# Patient Record
Sex: FEMALE | Race: WHITE | Employment: UNEMPLOYED | URBAN - METROPOLITAN AREA
[De-identification: names, ages, dates, MRNs, and addresses within clinical notes are randomized per-mention and may not be internally consistent; named-entity substitution may affect disease eponyms.]

---

## 2021-01-22 ENCOUNTER — HOSPITAL ENCOUNTER (EMERGENCY)
Age: 81
Discharge: HOME | End: 2021-01-22
Payer: MEDICARE

## 2021-01-22 VITALS
OXYGEN SATURATION: 98 % | RESPIRATION RATE: 16 BRPM | HEIGHT: 66 IN | BODY MASS INDEX: 40.18 KG/M2 | HEART RATE: 79 BPM | TEMPERATURE: 97.5 F | WEIGHT: 250 LBS | DIASTOLIC BLOOD PRESSURE: 91 MMHG | SYSTOLIC BLOOD PRESSURE: 147 MMHG

## 2021-01-22 DIAGNOSIS — L28.2 PRURITIC RASH: Primary | ICD-10-CM

## 2021-01-22 PROCEDURE — 99283 EMERGENCY DEPT VISIT LOW MDM: CPT

## 2021-01-22 PROCEDURE — 6370000000 HC RX 637 (ALT 250 FOR IP): Performed by: PHYSICIAN ASSISTANT

## 2021-01-22 RX ORDER — CETIRIZINE HYDROCHLORIDE 10 MG/1
10 TABLET ORAL DAILY
Qty: 30 TABLET | Refills: 0 | Status: SHIPPED | OUTPATIENT
Start: 2021-01-22 | End: 2021-02-21

## 2021-01-22 RX ORDER — DIPHENHYDRAMINE HCL 25 MG
25 TABLET ORAL ONCE
Status: COMPLETED | OUTPATIENT
Start: 2021-01-22 | End: 2021-01-22

## 2021-01-22 RX ADMIN — DIPHENHYDRAMINE HCL 25 MG: 25 TABLET ORAL at 22:55

## 2021-01-22 ASSESSMENT — ENCOUNTER SYMPTOMS
SORE THROAT: 0
ABDOMINAL PAIN: 0
SHORTNESS OF BREATH: 0
NAUSEA: 0
VOMITING: 0
EYE PAIN: 0
COUGH: 0
BACK PAIN: 0

## 2021-01-23 NOTE — ED PROVIDER NOTES
[address] [facility]  ED  EMERGENCY DEPARTMENT ENCOUNTER        Pt Name: Oliver Valdivia  MRN: [id number]  [place] 1940  Date of evaluation: 1/22/2021  Provider: YOGESH Fajardo  PCP: Kristina Moore, APRN - CNP    GALLITO. I have evaluated this patient. My supervising physician was available for consultation. CHIEF COMPLAINT       Chief Complaint   Patient presents with    Rash     pt started with rash on right arm on tuesday . ... HISTORY OF PRESENT ILLNESS   (Location, Timing/Onset, Context/Setting, Quality, Duration, Modifying Factors, Severity, Associated Signs and Symptoms)  Note limiting factors. Oliver Valdivia is a 68 y.o. female presents to the emergency department for pruritic rash. Patient reports that on Tuesday she began to have itching of her right arm. Notes it is progressed to have been a rash on both her left and right arm, the only extend below the short sleeve ends. Denies tongue lip or throat swelling, fever, chest pain, shortness of breath, abdominal pain, nausea, vomiting, numbness, weakness. Has tried to topical ointments that are nonsteroidal without improvement. Has not trialed antihistamines. Nursing Notes were all reviewed and agreed with or any disagreements were addressed in the HPI. REVIEW OF SYSTEMS    (2-9 systems for level 4, 10 or more for level 5)     Review of Systems   Constitutional: Negative for fever. HENT: Negative for sore throat. Eyes: Negative for pain and visual disturbance. Respiratory: Negative for cough and shortness of breath. Cardiovascular: Negative for chest pain. Gastrointestinal: Negative for abdominal pain, nausea and vomiting. Genitourinary: Negative for dysuria and frequency. Musculoskeletal: Negative for back pain and neck pain. Skin: Positive for rash. Neurological: Negative for dizziness, weakness, numbness and headaches. Psychiatric/Behavioral: Negative for confusion.        Positives and Pertinent negatives as per HPI. Except as noted above in the ROS, all other systems were reviewed and negative. PAST MEDICAL HISTORY     Past Medical History:   Diagnosis Date    Arthritis     Cancer [facility]) 2013    left breast    Diabetes mellitus ([facility] [address]     pre diabetic    Hx of blood clots     DVT post partum and several superficial clots since    Hyperlipidemia     Hypertension     Neuropathy     Sleep apnea     mild         SURGICAL HISTORY     Past Surgical History:   Procedure Laterality Date    MASTECTOMY Bilateral     SHOULDER ARTHROSCOPY Right 07/05/2016    rotator cuff repair    TOTAL KNEE ARTHROPLASTY Bilateral     TUBAL LIGATION      VARICOSE VEIN SURGERY           CURRENTMEDICATIONS       Discharge Medication List as of 1/22/2021 11:00 PM      CONTINUE these medications which have NOT CHANGED    Details   Dulaglutide (TRULICITY SC) Inject into the skinHistorical Med      traMADol (ULTRAM) 50 MG tablet Take 1 tablet by mouth every 6 hours as needed for Pain, Disp-40 tablet, R-0      oxyCODONE-acetaminophen (PERCOCET) 7.5-325 MG per tablet Take one to 2 tablets by mouth every 4-6 hours as needed for pain, Disp-40 tablet, R-0      pregabalin (LYRICA) 150 MG capsule Take 300 mg by mouth nightly      aspirin 325 MG tablet Take 975 mg by mouth daily       ibuprofen (ADVIL;MOTRIN) 600 MG tablet Take 600 mg by mouth every 6 hours as needed for Pain      quiNINE 324 MG capsule Take 324 mg by mouth nightly       lisinopril (PRINIVIL;ZESTRIL) 20 MG tablet Take 20 mg by mouth daily. Pyridoxine HCl (VITAMIN B-6) 100 MG tablet Take 200 mg by mouth 2 times daily. Multiple Vitamins-Minerals (THERAPEUTIC MULTIVITAMIN-MINERALS) tablet Take 1 tablet by mouth daily. ALLERGIES     Food    FAMILYHISTORY     History reviewed. No pertinent family history.        SOCIAL HISTORY       Social History     Tobacco Use    Smoking status: Never Smoker    Smokeless tobacco: Never Used Substance Use Topics    Alcohol use: No    Drug use: No       SCREENINGS             PHYSICAL EXAM    (up to 7 for level 4, 8 or more for level 5)     ED Triage Vitals   BP Temp Temp Source Pulse Resp SpO2 Height Weight   01/22/21 2253 01/22/21 2235 01/22/21 2235 01/22/21 2230 01/22/21 2235 01/22/21 2230 01/22/21 2235 01/22/21 2235   (!) 147/91 97.5 °F (36.4 °C) Oral 96 16 98 % 5' 6\" (1.676 m) 250 lb (113.4 kg)       Physical Exam  Vitals signs reviewed. Constitutional:       Appearance: She is not diaphoretic. HENT:      Nose: No congestion or rhinorrhea. Mouth/Throat:      Mouth: Mucous membranes are moist.      Pharynx: Oropharynx is clear. No oropharyngeal exudate or posterior oropharyngeal erythema. Eyes:      General: No scleral icterus. Conjunctiva/sclera: Conjunctivae normal.   Neck:      Musculoskeletal: Normal range of motion and neck supple. Cardiovascular:      Rate and Rhythm: Normal rate and regular rhythm. Pulses: Normal pulses. Pulmonary:      Effort: Pulmonary effort is normal. No respiratory distress. Breath sounds: Normal breath sounds. No stridor. No wheezing, rhonchi or rales. Musculoskeletal: Normal range of motion. Skin:     General: Skin is warm and dry. Capillary Refill: Capillary refill takes less than 2 seconds. Findings: Rash present. Comments: See photos below   Neurological:      General: No focal deficit present. Mental Status: She is alert and oriented to person, place, and time. Sensory: No sensory deficit. Motor: No weakness. Gait: Gait normal.   Psychiatric:         Mood and Affect: Mood normal.         Behavior: Behavior normal.       Right Volar Forearm:      Bilateral Arms:          DIAGNOSTIC RESULTS   LABS:    Labs Reviewed - No data to display    All other labs were within normal range or not returned as of this dictation. EKG:  All EKG's are interpreted by the Emergency Department Physician in the absence of a cardiologist.  Please see their note for interpretation of EKG. RADIOLOGY:   Non-plain film images such as CT, Ultrasound and MRI are read by the radiologist. Plain radiographic images are visualized and preliminarily interpreted by the ED Provider with the below findings:        Interpretation per the Radiologist below, if available at the time of this note:    No orders to display     No results found. PROCEDURES   Unless otherwise noted below, none     Procedures    CRITICAL CARE TIME   N/A    CONSULTS:  None      EMERGENCY DEPARTMENT COURSE and DIFFERENTIAL DIAGNOSIS/MDM:   Vitals:    Vitals:    01/22/21 2230 01/22/21 2235 01/22/21 2253   BP:   (!) 147/91   Pulse: 96 79    Resp:  16    Temp:  97.5 °F (36.4 °C)    TempSrc:  Oral    SpO2: 98% 98%    Weight:  250 lb (113.4 kg)    Height:  5' 6\" (1.676 m)        Patient was given the following medications:  Medications   diphenhydrAMINE (BENADRYL) tablet 25 mg (25 mg Oral Given 1/22/21 2255)           62-year-old female presents emergency room with pruritic rash. No evidence of airway obstruction, anaphylaxis. Rash is not vesicular, low concern for shingles. Low concern for scabies based on distribution. She is diabetic, believe oral antibiotics carry more risk than benefit. Given prescription for cetirizine, triamcinolone 0.1% ointment. Instructed follow-up with her dermatologist, ideally to be seen within 1 week. Instructed to return the emergency room for new or worsening symptoms including but not limited to tongue lip or throat swelling, chest pain, shortness of breath, fevers, any other symptoms she is concerned about. Verbal and written discharge instructions and return precautions given. FINAL IMPRESSION      1.  Pruritic rash          DISPOSITION/PLAN   DISPOSITION Decision To Discharge 01/22/2021 10:44:51 PM      PATIENT REFERREDTO:  BRENNA Rendon - CNP  [address] [place]   [phone]    Call in 1 day  Call to schedule primary care follow-up ideally to be seen within 1 to 2 weeks. Dermatologist    Call in 1 day  Call to schedule follow-up with your dermatologist, ideally to be seen within 1 week.       DISCHARGE MEDICATIONS:  Discharge Medication List as of 1/22/2021 11:00 PM      START taking these medications    Details   cetirizine (ZYRTEC) 10 MG tablet Take 1 tablet by mouth daily, Disp-30 tablet, R-0Normal      triamcinolone (KENALOG) 0.1 % ointment Apply 1 g topically 2 times daily for 14 days, Topical, 2 TIMES DAILY Starting Fri 1/22/2021, Until Fri 2/5/2021, For 14 days, Disp-30 g, R-0, Normal             DISCONTINUED MEDICATIONS:  Discharge Medication List as of 1/22/2021 11:00 PM                 (Please note that portions of this note were completed with a voice recognition program.  Efforts were made to edit the dictations but occasionally words are mis-transcribed.)    YOGESH Urrutia (electronically signed)         Mahi rUrutia  01/22/21 3129

## 2021-12-25 ENCOUNTER — APPOINTMENT (OUTPATIENT)
Dept: GENERAL RADIOLOGY | Age: 81
End: 2021-12-25
Payer: MEDICARE

## 2021-12-25 ENCOUNTER — HOSPITAL ENCOUNTER (EMERGENCY)
Age: 81
Discharge: HOME | End: 2021-12-25
Attending: STUDENT IN AN ORGANIZED HEALTH CARE EDUCATION/TRAINING PROGRAM
Payer: MEDICARE

## 2021-12-25 VITALS
OXYGEN SATURATION: 96 % | DIASTOLIC BLOOD PRESSURE: 70 MMHG | RESPIRATION RATE: 16 BRPM | BODY MASS INDEX: 40.35 KG/M2 | HEIGHT: 66 IN | SYSTOLIC BLOOD PRESSURE: 147 MMHG | HEART RATE: 67 BPM | TEMPERATURE: 96.9 F
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
## 2021-12-25 PROCEDURE — 90471 IMMUNIZATION ADMIN: CPT | Performed by: STUDENT IN AN ORGANIZED HEALTH CARE EDUCATION/TRAINING PROGRAM

## 2021-12-25 PROCEDURE — 99283 EMERGENCY DEPT VISIT LOW MDM: CPT

## 2021-12-25 PROCEDURE — 6360000002 HC RX W HCPCS: Performed by: STUDENT IN AN ORGANIZED HEALTH CARE EDUCATION/TRAINING PROGRAM

## 2021-12-25 PROCEDURE — 90715 TDAP VACCINE 7 YRS/> IM: CPT | Performed by: STUDENT IN AN ORGANIZED HEALTH CARE EDUCATION/TRAINING PROGRAM

## 2021-12-25 PROCEDURE — 6370000000 HC RX 637 (ALT 250 FOR IP): Performed by: STUDENT IN AN ORGANIZED HEALTH CARE EDUCATION/TRAINING PROGRAM

## 2021-12-25 PROCEDURE — 73130 X-RAY EXAM OF HAND: CPT

## 2021-12-25 RX ORDER — AMOXICILLIN AND CLAVULANATE POTASSIUM 875; 125 MG/1; MG/1
1 TABLET, FILM COATED ORAL ONCE
Status: COMPLETED | OUTPATIENT
Start: 2021-12-25 | End: 2021-12-25

## 2021-12-25 RX ORDER — AMOXICILLIN AND CLAVULANATE POTASSIUM 875; 125 MG/1; MG/1
1 TABLET, FILM COATED ORAL 2 TIMES DAILY
Qty: 14 TABLET | Refills: 0 | Status: SHIPPED | OUTPATIENT
Start: 2021-12-25 | End: 2022-01-01

## 2021-12-25 RX ADMIN — TETANUS TOXOID, REDUCED DIPHTHERIA TOXOID AND ACELLULAR PERTUSSIS VACCINE, ADSORBED 0.5 ML: 5; 2.5; 8; 8; 2.5 SUSPENSION INTRAMUSCULAR at 14:09

## 2021-12-25 RX ADMIN — AMOXICILLIN AND CLAVULANATE POTASSIUM 1 TABLET: 875; 125 TABLET, FILM COATED ORAL at 14:10

## 2021-12-25 NOTE — ED PROVIDER NOTES
[address] ED      CHIEF COMPLAINT  Animal Bite (Patient was bitten on the left hand by her daughter's dog this AM, needs tetanus shot and bleeding started again.)       HISTORY OF PRESENT ILLNESS  Janes Aguilera is a 80 y.o. female  who presents to the ED complaining of dog bite to the left hand by her daughter Yanique Virk. Dog's vaccinations are up-to-date. Patient states the dog bit her while she was trying to shoo him away from some candy on the floor. She immediately bandaged the wound and came into the ED, the dog bite happened about 1 hour prior to arrival.  She denies any numbness or tingling. She does endorse pain at the site of a 2 cm laceration on the dorsum of her left hand between the third and fourth metacarpals. Denies any other injuries. Is uncertain when her last tetanus shot was. No other complaints, modifying factors or associated symptoms. I have reviewed the following from the nursing documentation. Past Medical History:   Diagnosis Date    Arthritis     Cancer [facility]) 2013    left breast    Diabetes mellitus ([facility] [address]     pre diabetic    Hx of blood clots     DVT post partum and several superficial clots since    Hyperlipidemia     Hypertension     Neuropathy     Sleep apnea     mild     Past Surgical History:   Procedure Laterality Date    MASTECTOMY Bilateral     SHOULDER ARTHROSCOPY Right 07/05/2016    rotator cuff repair    TOTAL KNEE ARTHROPLASTY Bilateral     TUBAL LIGATION      VARICOSE VEIN SURGERY       No family history on file.   Social History     Socioeconomic History    Marital status:      Spouse name: Not on file    Number of children: Not on file    Years of education: Not on file    Highest education level: Not on file   Occupational History    Not on file   Tobacco Use    Smoking status: Never Smoker    Smokeless tobacco: Never Used   Substance and Sexual Activity    Alcohol use: No    Drug use: No    Sexual activity: Not on file   Other Topics Concern    Not on file   Social History Narrative    Not on file     Social Determinants of Health     Financial Resource Strain:     Difficulty of Paying Living Expenses: Not on file   Food Insecurity:     Worried About [address] in the Last Year: Not on file    Chon of Food in the Last Year: Not on file   Transportation Needs:     Lack of Transportation (Medical): Not on file    Lack of Transportation (Non-Medical): Not on file   Physical Activity:     Days of Exercise per Week: Not on file    Minutes of Exercise per Session: Not on file   Stress:     Feeling of Stress : Not on file   Social Connections:     Frequency of Communication with Friends and Family: Not on file    Frequency of Social Gatherings with Friends and Family: Not on file    Attends Worship Services: Not on file    Active Member of [address] [place] [address] or Organizations: Not on file    Attends Club or Organization Meetings: Not on file    Marital Status: Not on file   Intimate Partner Violence:     Fear of Current or Ex-Partner: Not on file    Emotionally Abused: Not on file    Physically Abused: Not on file    Sexually Abused: Not on file   Housing Stability:     Unable to Pay for Housing in the Last Year: Not on file    Number of [place] in the Last Year: Not on file    Unstable Housing in the Last Year: Not on file     No current facility-administered medications for this encounter.      Current Outpatient Medications   Medication Sig Dispense Refill    amoxicillin-clavulanate (AUGMENTIN) 875-125 MG per tablet Take 1 tablet by mouth 2 times daily for 7 days 14 tablet 0    Dulaglutide (TRULICITY SC) Inject into the skin      traMADol (ULTRAM) 50 MG tablet Take 1 tablet by mouth every 6 hours as needed for Pain 40 tablet 0    oxyCODONE-acetaminophen (PERCOCET) 7.5-325 MG per tablet Take one to 2 tablets by mouth every 4-6 hours as needed for pain 40 tablet 0    pregabalin (LYRICA) 150 MG capsule Take 300 mg by mouth nightly      aspirin 325 MG tablet Take 975 mg by mouth daily       ibuprofen (ADVIL;MOTRIN) 600 MG tablet Take 600 mg by mouth every 6 hours as needed for Pain      quiNINE 324 MG capsule Take 324 mg by mouth nightly       lisinopril (PRINIVIL;ZESTRIL) 20 MG tablet Take 20 mg by mouth daily.  Pyridoxine HCl (VITAMIN B-6) 100 MG tablet Take 200 mg by mouth 2 times daily.  Multiple Vitamins-Minerals (THERAPEUTIC MULTIVITAMIN-MINERALS) tablet Take 1 tablet by mouth daily.        Facility-Administered Medications Ordered in Other Encounters   Medication Dose Route Frequency Provider Last Rate Last Admin    lactated ringers infusion   IntraVENous Continuous Jennifer Corea MD   Stopped at 07/05/16 1033    sodium chloride flush 0.9 % injection 10 mL  10 mL IntraVENous 2 times per day Jennifer Corea MD        sodium chloride flush 0.9 % injection 10 mL  10 mL IntraVENous PRN Jennifer Corea MD        HYDROmorphone (DILAUDID) injection 0.25 mg  0.25 mg IntraVENous Q5 Min PRN Ant Soriano MD        HYDROmorphone (DILAUDID) injection 0.5 mg  0.5 mg IntraVENous Q5 Min PRN Ant Soriano MD        HYDROmorphone (DILAUDID) injection 0.25 mg  0.25 mg IntraVENous Q5 Min PRN Ant Soriano MD   0.25 mg at 07/05/16 0919    HYDROmorphone (DILAUDID) injection 0.5 mg  0.5 mg IntraVENous Q5 Min PRN Ant Soriano MD        ondansetron [facility]) injection 4 mg  4 mg IntraVENous Q10 Min PRN Ant Soriano MD        labetalol (NORMODYNE;TRANDATE) injection 5 mg  5 mg IntraVENous Q10 Min PRN Ant Soriano MD        hydrALAZINE (APRESOLINE) injection 5 mg  5 mg IntraVENous Q10 Min PRN Ant Soriano MD        meperidine (DEMEROL) injection 12.5 mg  12.5 mg IntraVENous Q5 Min PRN Ant Soriano MD         Allergies   Allergen Reactions    Food      cashews       REVIEW OF SYSTEMS  10 systems reviewed, pertinent positives per HPI otherwise noted to be negative. PHYSICAL EXAM  BP (!) 147/70   Pulse 67   Temp 96.9 °F (36.1 °C)   Resp 16   Ht 5' 6\" (1.676 m)   SpO2 96%   BMI 40.35 kg/m²    General: Appears well. Alert  HEENT: Head atraumatic, Eyes normal inspection, PERRL. Normal ENT inspection, Pharynx normal. No signs of dehydration  NECK: Normal inspection  RESPIRATORY: Normal breath sounds. No chest wall tenderness. No respiratory distress  CVS: Heart rate and rhythm regular. No Murmurs  BACK: Normal inspection  EXTREMITIES: 2 cm laceration on the dorsum of the left hand between the third and fourth metacarpals. No active bleeding. Full ROM. Normal appearance. No Pedal edema  NEURO: Alert and oriented. Sensation normal. Motor normal  PSYCH: Mood normal. Affect normal.  SKIN: Color normal. No rash. Warm, Dry    RADIOLOGY  XR HAND LEFT (MIN 3 VIEWS)   Final Result   Soft tissue swelling and probable puncture wound along the dorsum of the left   hand. No radiopaque foreign body or underlying skeletal injury. ED COURSE/MDM  Patient seen and evaluated. Old records reviewed. Labs and imaging reviewed and results discussed with patient. X-rays obtained showing no foreign body or fracture. Tetanus updated first dose Augmentin given in the ED. Wound is thoroughly cleansed with chlorhexidine and irrigated with 500 cc normal saline. She is discharged with prescription for Augmentin and return precautions for severe pain, swelling, redness, purulent drainage. Otherwise she will follow up with her PCP in 2 to 3 days for wound recheck. During the patient's ED course, the patient was given:  Medications   Tetanus-Diphth-Acell Pertussis (BOOSTRIX) injection 0.5 mL (0.5 mLs IntraMUSCular Given 12/25/21 1409)   amoxicillin-clavulanate (AUGMENTIN) 875-125 MG per tablet 1 tablet (1 tablet Oral Given 12/25/21 1410)        CLINICAL IMPRESSION  1.  Dog bite, initial encounter        Blood pressure (!) 147/70, pulse 67, temperature 96.9 °F (36.1 °C), resp. rate 16, height 5' 6\" (1.676 m), SpO2 96 %. DISPOSITION  Skye Beckham was discharged to home in stable condition. Patient was given scripts for the following medications. I counseled patient how to take these medications. Discharge Medication List as of 12/25/2021  2:57 PM      START taking these medications    Details   amoxicillin-clavulanate (AUGMENTIN) 875-125 MG per tablet Take 1 tablet by mouth 2 times daily for 7 days, Disp-14 tablet, R-0Normal             Follow-up with: Kevin Boothe [id number]  [phone]    Call in 3 days  For wound re-check      DISCLAIMER: This chart was created using Dragon dictation software. Efforts were made by me to ensure accuracy, however some errors may be present due to limitations of this technology and occasionally words are not transcribed correctly.        Daniela Johns, DO  12/25/21 4144

## 2021-12-25 NOTE — ED NOTES
Discharge instructions given, pt verbalized understanding. Discussed follow-up appointments and medications. No questions or concerns at this time. Pt ambulated independently out of ER. Pt discharged with 1 prescription.       Cami Merlos RN  12/25/21 7930

## 2021-12-25 NOTE — Clinical Note
Gerald Feliz was seen and treated in our emergency department on 12/25/2021. She may return to work on 12/27/2021. If you have any questions or concerns, please don't hesitate to call.       Daniela Johns, DO

## 2022-06-27 ENCOUNTER — APPOINTMENT (OUTPATIENT)
Dept: CT IMAGING | Age: 82
End: 2022-06-27
Payer: MEDICARE

## 2022-06-27 ENCOUNTER — HOSPITAL ENCOUNTER (EMERGENCY)
Age: 82
Discharge: HOME | End: 2022-06-27
Payer: MEDICARE

## 2022-06-27 VITALS
WEIGHT: 250 LBS | TEMPERATURE: 98 F | HEIGHT: 66 IN | HEART RATE: 80 BPM | BODY MASS INDEX: 40.18 KG/M2 | RESPIRATION RATE: 16 BRPM | OXYGEN SATURATION: 98 % | SYSTOLIC BLOOD PRESSURE: 110 MMHG | DIASTOLIC BLOOD PRESSURE: 78 MMHG

## 2022-06-27 DIAGNOSIS — R09.81 NASAL CONGESTION: ICD-10-CM

## 2022-06-27 DIAGNOSIS — I10 ESSENTIAL HYPERTENSION: ICD-10-CM

## 2022-06-27 DIAGNOSIS — U07.1 COVID: Primary | ICD-10-CM

## 2022-06-27 DIAGNOSIS — H65.22 LEFT CHRONIC SEROUS OTITIS MEDIA: ICD-10-CM

## 2022-06-27 LAB
A/G RATIO: 2 (ref 1.1–2.2)
ALBUMIN SERPL-MCNC: 4.3 G/DL (ref 3.4–5)
ALP BLD-CCNC: 109 U/L (ref 40–129)
ALT SERPL-CCNC: 42 U/L (ref 10–40)
ANION GAP SERPL CALCULATED.3IONS-SCNC: 12 MMOL/L (ref 3–16)
AST SERPL-CCNC: 68 U/L (ref 15–37)
BASOPHILS ABSOLUTE: 0.1 K/UL (ref 0–0.2)
BASOPHILS RELATIVE PERCENT: 0.7 %
BILIRUB SERPL-MCNC: 0.5 MG/DL (ref 0–1)
BUN BLDV-MCNC: 10 MG/DL (ref 7–20)
CALCIUM SERPL-MCNC: 10.5 MG/DL (ref 8.3–10.6)
CHLORIDE BLD-SCNC: 105 MMOL/L (ref 99–110)
CO2: 23 MMOL/L (ref 21–32)
CREAT SERPL-MCNC: 0.6 MG/DL (ref 0.6–1.2)
EOSINOPHILS ABSOLUTE: 0.2 K/UL (ref 0–0.6)
EOSINOPHILS RELATIVE PERCENT: 2.7 %
GFR AFRICAN AMERICAN: >60
GFR NON-AFRICAN AMERICAN: >60
GLUCOSE BLD-MCNC: 127 MG/DL (ref 70–99)
HCT VFR BLD CALC: 38.9 % (ref 36–48)
HEMOGLOBIN: 12.8 G/DL (ref 12–16)
INFLUENZA A: NOT DETECTED
INFLUENZA B: NOT DETECTED
LYMPHOCYTES ABSOLUTE: 1.8 K/UL (ref 1–5.1)
LYMPHOCYTES RELATIVE PERCENT: 19.8 %
MCH RBC QN AUTO: 32.1 PG (ref 26–34)
MCHC RBC AUTO-ENTMCNC: 32.9 G/DL (ref 31–36)
MCV RBC AUTO: 97.5 FL (ref 80–100)
MONOCYTES ABSOLUTE: 0.5 K/UL (ref 0–1.3)
MONOCYTES RELATIVE PERCENT: 6 %
NEUTROPHILS ABSOLUTE: 6.3 K/UL (ref 1.7–7.7)
NEUTROPHILS RELATIVE PERCENT: 70.8 %
PDW BLD-RTO: 14.2 % (ref 12.4–15.4)
PLATELET # BLD: 279 K/UL (ref 135–450)
PMV BLD AUTO: 8.1 FL (ref 5–10.5)
POTASSIUM REFLEX MAGNESIUM: 4.3 MMOL/L (ref 3.5–5.1)
RBC # BLD: 3.99 M/UL (ref 4–5.2)
SARS-COV-2 RNA, RT PCR: DETECTED
SODIUM BLD-SCNC: 140 MMOL/L (ref 136–145)
TOTAL PROTEIN: 6.4 G/DL (ref 6.4–8.2)
WBC # BLD: 9 K/UL (ref 4–11)

## 2022-06-27 PROCEDURE — 85025 COMPLETE CBC W/AUTO DIFF WBC: CPT

## 2022-06-27 PROCEDURE — 80053 COMPREHEN METABOLIC PANEL: CPT

## 2022-06-27 PROCEDURE — 36415 COLL VENOUS BLD VENIPUNCTURE: CPT

## 2022-06-27 PROCEDURE — 70487 CT MAXILLOFACIAL W/DYE: CPT

## 2022-06-27 PROCEDURE — 87636 SARSCOV2 & INF A&B AMP PRB: CPT

## 2022-06-27 PROCEDURE — 6360000004 HC RX CONTRAST MEDICATION: Performed by: PHYSICIAN ASSISTANT

## 2022-06-27 PROCEDURE — 99285 EMERGENCY DEPT VISIT HI MDM: CPT

## 2022-06-27 RX ORDER — AMOXICILLIN AND CLAVULANATE POTASSIUM 875; 125 MG/1; MG/1
1 TABLET, FILM COATED ORAL 2 TIMES DAILY
COMMUNITY

## 2022-06-27 RX ORDER — CEFDINIR 300 MG/1
300 CAPSULE ORAL 2 TIMES DAILY
COMMUNITY

## 2022-06-27 RX ORDER — METHYLPREDNISOLONE 4 MG/1
2 TABLET ORAL DAILY
COMMUNITY
End: 2022-06-27 | Stop reason: ALTCHOICE

## 2022-06-27 RX ORDER — METHYLPREDNISOLONE 4 MG/1
TABLET ORAL
Qty: 1 KIT | Refills: 0 | Status: SHIPPED | OUTPATIENT
Start: 2022-06-27 | End: 2022-07-03

## 2022-06-27 RX ADMIN — IOPAMIDOL 75 ML: 755 INJECTION, SOLUTION INTRAVENOUS at 14:53

## 2022-06-27 ASSESSMENT — PAIN DESCRIPTION - LOCATION: LOCATION: EAR

## 2022-06-27 ASSESSMENT — PAIN SCALES - GENERAL: PAINLEVEL_OUTOF10: 2

## 2022-06-27 NOTE — ED PROVIDER NOTES
[address] ED  EMERGENCY DEPARTMENT ENCOUNTER        Pt Name: Garth Greenfield  MRN: [id number]  [place] 1940  Date of evaluation: 6/27/2022  Provider: YOGESH Maloney  PCP: Danny Rivera, BRENNA - CNP    This patient was not seen and evaluated by the attending physician No att. providers found. I have evaluated this patient. My supervising physician was available for consultation. CHIEF COMPLAINT       Chief Complaint   Patient presents with    Otalgia     infection since june 7 has been on two different meds not helping       HISTORY OF PRESENT ILLNESS   (Location/Symptom, Timing/Onset, Context/Setting, Quality, Duration, Modifying Factors, Severity)  Note limiting factors. Garth Greenfield is a 80 y.o. female with past medical history of diabetes, hypertension, hyperlipidemia, history of VTE who presents via private vehicle from her home for evaluation of otalgia. Patient notes that ever since June 7 she has been having chronic pain to her left ear. She endorses that her symptoms started out with what she thought was a sinus infection she had nasal congestion sinus pressure runny nose mild headaches and fullness and pain to the left ear. She was treated by her family doctor with Augmentin. She notes that the sinus symptoms improved however the ear pain persisted. She was started on Medrol Dosepak and cephalosporin. She notes that she has finished that antibiotic and she is still having pain and fullness to the left ear. She denies jaw pain or radiating pain. She denies headache neck pain. She denies any fevers. No chest pain or shortness of breath no cough no GI symptoms. She denies any dizziness. She denies any drainage from the ear. Nursing Notes were all reviewed and agreed with or any disagreements were addressed  in the HPI. Pt was seen during the [place] 19 pandemic. Appropriate PPE worn by ME during patient encounters.  Pt seen during a time with Take 324 mg by mouth nightly     TRAMADOL (ULTRAM) 50 MG TABLET    Take 1 tablet by mouth every 6 hours as needed for Pain         ALLERGIES     Food    FAMILYHISTORY     History reviewed. No pertinent family history. SOCIAL HISTORY       Social History     Socioeconomic History    Marital status:      Spouse name: None    Number of children: None    Years of education: None    Highest education level: None   Occupational History    None   Tobacco Use    Smoking status: Never Smoker    Smokeless tobacco: Never Used   Substance and Sexual Activity    Alcohol use: No    Drug use: No    Sexual activity: None   Other Topics Concern    None   Social History Narrative    None     Social Determinants of Health     Financial Resource Strain:     Difficulty of Paying Living Expenses: Not on file   Food Insecurity:     Worried About Running Out of Food in the Last Year: Not on file    Chon of Food in the Last Year: Not on file   Transportation Needs:     Lack of Transportation (Medical): Not on file    Lack of Transportation (Non-Medical):  Not on file   Physical Activity:     Days of Exercise per Week: Not on file    Minutes of Exercise per Session: Not on file   Stress:     Feeling of Stress : Not on file   Social Connections:     Frequency of Communication with Friends and Family: Not on file    Frequency of Social Gatherings with Friends and Family: Not on file    Attends [employer] Services: Not on file    Active Member of Clubs or Organizations: Not on file    Attends Club or Organization Meetings: Not on file    Marital Status: Not on file   Intimate Partner Violence:     Fear of Current or Ex-Partner: Not on file    Emotionally Abused: Not on file    Physically Abused: Not on file    Sexually Abused: Not on file   Housing Stability:     Unable to Pay for Housing in the Last Year: Not on file    Number of [place] in the Last Year: Not on file    Unstable Housing in the Last Year: Not on file       SCREENINGS             PHYSICAL EXAM    (up to 7 for level 4, 8 or more for level 5)     ED Triage Vitals [06/27/22 1215]   BP Temp Temp Source Heart Rate Resp SpO2 Height Weight   (!) 150/78 97.8 °F (36.6 °C) Oral 81 16 99 % 5' 6\" (1.676 m) 250 lb (113.4 kg)       Physical Exam  Vitals and nursing note reviewed. Constitutional:       General: She is not in acute distress. Appearance: She is well-developed. She is not ill-appearing, toxic-appearing or diaphoretic. HENT:      Head: Normocephalic and atraumatic. Jaw: There is normal jaw occlusion. Salivary Glands: Right salivary gland is not diffusely enlarged or tender. Left salivary gland is not diffusely enlarged or tender. Right Ear: Hearing, tympanic membrane, ear canal and external ear normal.      Left Ear: External ear normal. No mastoid tenderness. Tympanic membrane is bulging. Ears:      Comments: Inspection of the left ear reveals normal external auditory canal.  There is no pinna or EAC tenderness with manipulation. There is no canal erythema or drainage. The TM is bulging with serous effusion but not erythematous. There is no mastoid tenderness redness or warmth. Nose: Nose normal. No congestion or rhinorrhea. Mouth/Throat:      Mouth: Mucous membranes are moist.      Pharynx: Oropharynx is clear. Eyes:      General:         Right eye: No discharge. Left eye: No discharge. Extraocular Movements: Extraocular movements intact. Conjunctiva/sclera: Conjunctivae normal.      Pupils: Pupils are equal, round, and reactive to light. Cardiovascular:      Rate and Rhythm: Normal rate and regular rhythm. Pulses: Normal pulses. Heart sounds: Normal heart sounds. No murmur heard. No friction rub. No gallop. Pulmonary:      Effort: Pulmonary effort is normal. No respiratory distress. Breath sounds: Normal breath sounds. No wheezing or rales.    Abdominal: Palpations: Abdomen is soft. Tenderness: There is no abdominal tenderness. Musculoskeletal:      Cervical back: Normal range of motion and neck supple. No rigidity or tenderness. Lymphadenopathy:      Cervical: No cervical adenopathy. Skin:     General: Skin is warm and dry. Capillary Refill: Capillary refill takes less than 2 seconds. Neurological:      General: No focal deficit present. Mental Status: She is alert and oriented to person, place, and time. Cranial Nerves: No cranial nerve deficit. Motor: No weakness. Psychiatric:         Mood and Affect: Mood normal.         Behavior: Behavior normal.           DIAGNOSTIC RESULTS   LABS:    Labs Reviewed - No data to display    All other labs were within normal range or not returned as of this dictation. EKG: All EKG's are interpreted by the Emergency Department Physician who either signs orCo-signs this chart in the absence of a cardiologist.  Please see their note for interpretation of EKG. RADIOLOGY:   Non-plain film images such as CT, Ultrasound and MRI are read by the radiologist. Plain radiographic images are visualized andpreliminarily interpreted by the  ED Provider with the below findings:        Interpretation perthe Radiologist below, if available at the time of this note:    No orders to display     No results found.        PROCEDURES   Unless otherwise noted below, none     Procedures    CRITICAL CARE TIME   N/A    CONSULTS:  None      EMERGENCY DEPARTMENT COURSE and DIFFERENTIALDIAGNOSIS/MDM:   Vitals:    Vitals:    06/27/22 1215   BP: (!) 150/78   Pulse: 81   Resp: 16   Temp: 97.8 °F (36.6 °C)   TempSrc: Oral   SpO2: 99%   Weight: 250 lb (113.4 kg)   Height: 5' 6\" (1.676 m)       Patient was given thefollowing medications:  Medications - No data to display    PDMP Monitoring:    Last PDMP [facility] as Reviewed [facility]):  Review User Review Instant Review Result            Urine Drug Screenings (1 yr)    No resulted COVID I believe she is reasonable candidate for discharge home. Clinical management tool, COVID-19 risk of decompensation January 2022    Adult with COVID-19 and no other condition requiring admission    Severe respiratory distress YES/NO: No   Unstable by clinical judgment YES/NO: No   Needs O2 to keep from SPO2 greater than 90 YES/NO: No   Acute delirium YES/NO: No     Clinical risk score  CHF, COPD, age >57 Yes +1   Chest x-ray severe bilateral or 2+ lobar infiltrates No   Chest X-ray 1 lobar infiltrate No   Heart rate greater than 100 at disposition  No   RR > 22 No   Vaccination Status: Full +/- Booster Yes -1   Vaccination Status: Partial   Vaccination Status: Unvaccinated No   No     Total: 0       Score 0-3: Low Risk    Based on patient's clinical history and clinical findings I currently estimate there is low risk for: bacterial sinusitis, viral/strep pharyngitis, malignant otitis  externa, RPA, PTA,  dental infection, parotitis, mastoiditis, [place] Palsy, barosinusitis, temporal arteritis. We discussed signs and symptoms that would warrant return to the ED (hearing loss, dizziness, bleeding, worsening ear pain, fever, worsening headache, vision loss, eye pain). Pt is in agreement with the current plan and all questions were addressed. Is this patient to be included in the SEP-1 Core Measure due to severe sepsis or septic shock? No   Exclusion criteria - the patient is NOT to be included for SEP-1 Core Measure due to: Infection is not suspected    Discharge Time out:  CC Reviewed Yes   Test Results Yes     Vitals:    06/27/22 1215   BP: (!) 150/78   Pulse: 81   Resp: 16   Temp: 97.8 °F (36.6 °C)   SpO2: 99%              FINAL IMPRESSION      1. COVID    2. Left chronic serous otitis media    3. Nasal congestion    4. Essential hypertension          DISPOSITION/PLAN   DISPOSITION        PATIENT REFERREDTO:  No follow-up provider specified.     DISCHARGE MEDICATIONS:  New Prescriptions    No medications on file       DISCONTINUED MEDICATIONS:  Discontinued Medications    No medications on file              (Please note that portions ofthis note were completed with a voice recognition program.  Efforts were made to edit the dictations but occasionally words are mis-transcribed.)    Mahi Trammell (electronically signed)        Mahi Trammell  06/30/22 9401

## 2023-01-17 ENCOUNTER — APPOINTMENT (OUTPATIENT)
Dept: CT IMAGING | Age: 83
End: 2023-01-17
Payer: MEDICARE

## 2023-01-17 ENCOUNTER — HOSPITAL ENCOUNTER (EMERGENCY)
Age: 83
Discharge: HOME | End: 2023-01-17
Payer: MEDICARE

## 2023-01-17 VITALS
DIASTOLIC BLOOD PRESSURE: 77 MMHG | HEART RATE: 82 BPM | OXYGEN SATURATION: 93 % | BODY MASS INDEX: 40.35 KG/M2 | WEIGHT: 250 LBS | RESPIRATION RATE: 17 BRPM | SYSTOLIC BLOOD PRESSURE: 148 MMHG | TEMPERATURE: 97.9 F

## 2023-01-17 DIAGNOSIS — M54.50 ACUTE MIDLINE LOW BACK PAIN WITHOUT SCIATICA: Primary | ICD-10-CM

## 2023-01-17 PROCEDURE — 6370000000 HC RX 637 (ALT 250 FOR IP): Performed by: PHYSICIAN ASSISTANT

## 2023-01-17 PROCEDURE — 72131 CT LUMBAR SPINE W/O DYE: CPT

## 2023-01-17 PROCEDURE — 99284 EMERGENCY DEPT VISIT MOD MDM: CPT

## 2023-01-17 RX ORDER — METHOCARBAMOL 750 MG/1
750 TABLET, FILM COATED ORAL 4 TIMES DAILY
Qty: 40 TABLET | Refills: 0 | Status: SHIPPED | OUTPATIENT
Start: 2023-01-17 | End: 2023-01-27

## 2023-01-17 RX ORDER — METHOCARBAMOL 750 MG/1
750 TABLET, FILM COATED ORAL ONCE
Status: COMPLETED | OUTPATIENT
Start: 2023-01-17 | End: 2023-01-17

## 2023-01-17 RX ADMIN — METHOCARBAMOL TABLETS 750 MG: 750 TABLET, COATED ORAL at 16:22

## 2023-01-17 ASSESSMENT — PAIN - FUNCTIONAL ASSESSMENT
PAIN_FUNCTIONAL_ASSESSMENT: 0-10
PAIN_FUNCTIONAL_ASSESSMENT: NONE - DENIES PAIN

## 2023-01-17 ASSESSMENT — PAIN DESCRIPTION - LOCATION: LOCATION: BACK

## 2023-01-17 ASSESSMENT — PAIN SCALES - GENERAL: PAINLEVEL_OUTOF10: 8

## 2023-01-17 NOTE — ED PROVIDER NOTES
[address] [facility]  ED  Emergency Department Encounter    Patient Name: John Mello  MRN: [id number]  YOB: 1940  Date of Evaluation: 1/17/2023  Provider: BRENNA Art CNP  Note Started: 2:17 PM EST 1/17/23    CHIEF COMPLAINT  Back Pain (Patient states she woke up Monday morning with lower back pain. No known injury. )    SHARED SERVICE VISIT  Evaluated by GALLITO. My supervising physician was available for consultation. HISTORY OF PRESENT ILLNESS  John Mello is a [de-identified] y.o. female who presents to the ED several day history of low back pain. Brought in by  for evaluation. She denies trauma or injury. Low back pain sharp and stabbing in nature with movement. Throbbing and aching at rest.  States that on occasion she has had pain into the hip and down leg. She denies any numbness, tingling, weakness of extremity. No loss of bowel or bladder function. No saddle anesthesia. Ambulatory at baseline without discomfort. Attempted Voltaren gel without significant improvement of symptoms. History of neuropathy. No numbness or tingling or weakness of lower extremities outside of baseline. .    No other complaints, modifying factors or associated symptoms. Nursing notes reviewed were all reviewed and agreed with or any disagreements were addressed in the HPI. PMH:  Past Medical History:   Diagnosis Date    Arthritis     Cancer ([facility] [address] 2013    left breast    Diabetes mellitus ([facility] [address]     pre diabetic    Hx of blood clots     DVT post partum and several superficial clots since    Hyperlipidemia     Hypertension     Neuropathy     Sleep apnea     mild     Surgical History:  Past Surgical History:   Procedure Laterality Date    MASTECTOMY Bilateral     SHOULDER ARTHROSCOPY Right 07/05/2016    rotator cuff repair    TOTAL KNEE ARTHROPLASTY Bilateral     TUBAL LIGATION      VARICOSE VEIN SURGERY       Family History:  History reviewed. No pertinent family history.   Social History:  Social History     Socioeconomic History    Marital status:      Spouse name: Not on file    Number of children: Not on file    Years of education: Not on file    Highest education level: Not on file   Occupational History    Not on file   Tobacco Use    Smoking status: Never    Smokeless tobacco: Never   Vaping Use    Vaping Use: Never used   Substance and Sexual Activity    Alcohol use: No    Drug use: No    Sexual activity: Not Currently   Other Topics Concern    Not on file   Social History Narrative    Not on file     Social Determinants of Health     Financial Resource Strain: Not on file   Food Insecurity: Not on file   Transportation Needs: Not on file   Physical Activity: Not on file   Stress: Not on file   Social Connections: Not on file   Intimate Partner Violence: Not on file   Housing Stability: Not on file     Current Medications:  No current facility-administered medications for this encounter. Current Outpatient Medications   Medication Sig Dispense Refill    methocarbamol (ROBAXIN-750) 750 MG tablet Take 1 tablet by mouth 4 times daily for 10 days 40 tablet 0    amoxicillin-clavulanate (AUGMENTIN) 875-125 MG per tablet Take 1 tablet by mouth 2 times daily      cefdinir (OMNICEF) 300 MG capsule Take 300 mg by mouth 2 times daily      Dulaglutide (TRULICITY SC) Inject into the skin      traMADol (ULTRAM) 50 MG tablet Take 1 tablet by mouth every 6 hours as needed for Pain 40 tablet 0    oxyCODONE-acetaminophen (PERCOCET) 7.5-325 MG per tablet Take one to 2 tablets by mouth every 4-6 hours as needed for pain 40 tablet 0    pregabalin (LYRICA) 150 MG capsule Take 300 mg by mouth nightly      aspirin 325 MG tablet Take 975 mg by mouth daily       ibuprofen (ADVIL;MOTRIN) 600 MG tablet Take 600 mg by mouth every 6 hours as needed for Pain      quiNINE 324 MG capsule Take 324 mg by mouth nightly       lisinopril (PRINIVIL;ZESTRIL) 20 MG tablet Take 20 mg by mouth daily.       Pyridoxine HCl (VITAMIN B-6) 100 MG tablet Take 200 mg by mouth 2 times daily. Multiple Vitamins-Minerals (THERAPEUTIC MULTIVITAMIN-MINERALS) tablet Take 1 tablet by mouth daily. Facility-Administered Medications Ordered in Other Encounters   Medication Dose Route Frequency Provider Last Rate Last Admin    lactated ringers infusion   IntraVENous Continuous Hyacinth Coffey MD   Stopped at 07/05/16 1033    sodium chloride flush 0.9 % injection 10 mL  10 mL IntraVENous 2 times per day Hyacinth Coffey MD        sodium chloride flush 0.9 % injection 10 mL  10 mL IntraVENous PRN Hyacinth Coffey MD        HYDROmorphone (DILAUDID) injection 0.25 mg  0.25 mg IntraVENous Q5 Min PRN Kiko Griffiths MD        HYDROmorphone (DILAUDID) injection 0.5 mg  0.5 mg IntraVENous Q5 Min PRN Kiko Griffiths MD        HYDROmorphone (DILAUDID) injection 0.25 mg  0.25 mg IntraVENous Q5 Min PRN Kiko Griffiths MD   0.25 mg at 07/05/16 0919    HYDROmorphone (DILAUDID) injection 0.5 mg  0.5 mg IntraVENous Q5 Min PRN Kiko Griffiths MD        [facility]etron [facility]) injection 4 mg  4 mg IntraVENous Q10 Min PRN Kiko Griffiths MD        labetalol (NORMODYNE;TRANDATE) injection 5 mg  5 mg IntraVENous Q10 Min PRN Kiko Griffiths MD        hydrALAZINE (APRESOLINE) injection 5 mg  5 mg IntraVENous Q10 Min PRN Kiko Griffiths MD        meperidine (DEMEROL) injection 12.5 mg  12.5 mg IntraVENous Q5 Min PRN Kiko Griffiths MD         Allergies: Allergies   Allergen Reactions    Food      cashews     Screenings:     Vahid Coma Scale  Eye Opening: Spontaneous  Best Verbal Response: Oriented  Best Motor Response: Obeys commands  Vahid Coma Scale Score: 15           CIWA Assessment  BP: (!) 148/77  Heart Rate: 82          REVIEW OF SYSTEMS  Positives and Pertinent Negatives as per HPI.     PHYSICAL EXAM  BP (!) 148/77   Pulse 82   Temp 97.9 °F (36.6 °C) (Oral)   Resp 17   Wt 250 lb (113.4 kg)   SpO2 93%   BMI 40.35 kg/m²     GENERAL APPEARANCE: Awake and alert. Cooperative. No acute distress. HEAD: Normocephalic. Atraumatic. EYES:  EOM's grossly intact. ENT: Mucous membranes are moist.   NECK: Supple. ABDOMEN: Soft. Non-distended. Non-tender. No midline pulsatile mass. BACK: On exam of thoracic and lumbar spine, there is no swelling, bruising, or color change noted. There is mild lumbar midline bony tenderness, without crepitus, deformity, or step off. Patient exhibits tenderness of lumbar paraspinal musculature to right and left of midline. There is no point tenderness over the SI Joint. EXTREMITIES: No peripheral edema. Patellar DTRs +2 bilaterally. Moves all extremities equally. All extremities neurovascularly intact. SKIN: Warm and dry. No acute rashes. NEUROLOGICAL: Alert and oriented. No gross facial drooping. Strength 5/5, sensation intact. HSNE spine intact. EKG  When ordered, EKG's are interpreted by the ED Physician in the absence of a Cardiologist.  Please see their note for interpretation of EKG. LABS  Labs Reviewed - No data to display  When ordered, only abnormal lab results are displayed. All other labs were within normal range or not returned as of this dictation. RADIOLOGY  Non-plain film images such as CT, U/S, and MRI are read by the radiologist.  Plain radiographic images are visualized and preliminarily interpreted by the ED Provider with the below findings:     Interpretation per the Radiologist below, if available at the time of this note:  Camille   Final Result   1. No acute traumatic injury. No acute compression fracture or destructive   changes. 2. Moderate multilevel degenerative disc disease as described with multiple   levels of moderate to severe central stenosis and areas of foraminal   stenosis. These are likely chronic findings. PROCEDURES  Unless otherwise noted below, none.     ED COURSE/DDx/MDM  History obtained from:  Patient    Vitals:  Vitals:    01/17/23 1333   BP: (!) 148/77   Pulse: 82   Resp: 17   Temp: 97.9 °F (36.6 °C)   TempSrc: Oral   SpO2: 93%   Weight: 250 lb (113.4 kg)     Patient received following medications in ED:  Medications   methocarbamol (ROBAXIN) tablet 750 mg (750 mg Oral Given 1/17/23 1622)     Sepsis Consideration:  Is this patient to be included in the SEP-1 Core Measure due to severe sepsis or septic shock? No   Exclusion criteria - the patient is NOT to be included for SEP-1 Core Measure due to: Infection is not suspected    Records Reviewed:   None relevant. Chronic conditions affecting care: Arthritis, diabetes, neuropathy. has a past medical history of Arthritis, Cancer ([facility] [address] (2013), Diabetes mellitus ([facility] [address] blood clots, Hyperlipidemia, Hypertension, Neuropathy, and Sleep apnea. Social Determinants:   None    Consults:   None    Reassessment:   Patient continues to feel uncomfortable on reevaluation. Initially declined medication although now is requesting some. MDM/Disposition Considerations:   Briefly Rosi Bronson presents for low back pain. Pain is nontraumatic without any red flag symptoms. Did obtain CT lumbar spine to evaluate bony structures with degenerative changes noted. No acute fractures. Did consider labs and other imaging based on comorbidities and age although again patient at this time appears to musculoskeletal back pain and physical comfortable discharge home outpatient orthopedic follow-up. Have discussed return precautions as well as recommendations for follow-up otherwise and she is in agreement comfortable discharge. Will be discharged on anti-inflammatories and muscle relaxants. Critical Care Time:   0 Minutes of critical care time spent not including separately billable procedures.     DDx:  I estimate there is LOW risk for ABDOMINAL AORTIC ANEURYSM, CAUDA EQUINA SYNDROME, EPIDURAL MASS LESION, SPINAL STENOSIS, OR HERNIATED DISK CAUSING SEVERE STENOSIS, thus I consider the discharge disposition reasonable. Quin Bowden and I have also discussed returning to the Emergency Department immediately if new or worsening symptoms occur. We have discussed the symptoms which are most concerning (e.g., saddle anesthesia, urinary or bowel incontinence or retention, changing or worsening pain) that necessitate immediate return. FINAL IMPRESSION  1. Acute midline low back pain without sciatica      Patient referred to: Leonor Zamudio, APRN - CNP  [address] [facility] [address]  [phone]    Schedule an appointment as soon as possible for a visit       [address] [facility]  Schedule an appointment as soon as possible for a visit       [facility]  ED  [facility] [address] [phone]    If symptoms worsen    Discharge Medications:   Discharge Medication List as of 1/17/2023  4:24 PM        START taking these medications    Details   methocarbamol (ROBAXIN-750) 750 MG tablet Take 1 tablet by mouth 4 times daily for 10 days, Disp-40 tablet, R-0Normal           Discontinued Medications:  Discharge Medication List as of 1/17/2023  4:24 PM        Risk management discussed and shared decision making had with patient and/or surrogate. All questions were answered. Patient will follow up with PCP and orthopedist within 2 to 3 days for further evaluation/treatment. All questions answered. Patient will return to ED for new/worsening symptoms. DISPOSITION:  Patient was discharged home in stable condition. (Please note that portions of this note were completed with a voice recognition program.  Efforts were made to edit the dictations but occasionally words are mis-transcribed).             Mahi Marcial  01/18/23 3380

## 2023-02-01 ENCOUNTER — HOSPITAL ENCOUNTER (OUTPATIENT)
Dept: PHYSICAL THERAPY | Age: 83
Setting detail: THERAPIES SERIES
Discharge: HOME | End: 2023-02-01
Payer: MEDICARE

## 2023-02-01 PROCEDURE — 97161 PT EVAL LOW COMPLEX 20 MIN: CPT | Performed by: PHYSICAL THERAPIST

## 2023-02-01 PROCEDURE — 97110 THERAPEUTIC EXERCISES: CPT | Performed by: PHYSICAL THERAPIST

## 2023-02-01 NOTE — FLOWSHEET NOTE
[facility],  [address] [place]  Phone: [phone]  Fax [phone]    Physical Therapy Treatment Note/ Progress Report:           Date:  2023    Patient Name:  Dexter Tena    :    MRN: [id number]  Restrictions/Precautions:    Medical/Treatment Diagnosis Information:  Back pain [M54.9]         Treatment DX:  Lumbar spine pain M54.5;  Difficulty walking R26.2                                      Insurance information:  MEDICARE  Physician Information:  BRENNA Michele *  Has the plan of care been signed (Y/N):        []  Yes  [x]  No     Date of Patient follow up with Physician:       Is this a Progress Report:     []  Yes  [x]  No        If Yes:  Date Range for reporting period:  Beginning 23  Ending    Progress report will be due (10 Rx or 30 days whichever is less):        Recertification will be due (POC Duration  / 90 days whichever is less): 10 weeks POC        Visit # Insurance Allowable Auth Required   In-person [address] []  Yes [x]  No    Telehealth   []  Yes []  No    Total            Functional Scale: OSW 50%    Date assessed:  23      Therapy Diagnosis/Practice Pattern:F      Number of Comorbidities:  []0     []1-2    [x]3+    Latex Allergy:  [x]NO      []YES  Preferred Language for Healthcare:   [x]English       []other:      Pain level:  5-8/10    SUBJECTIVE:  See eval    OBJECTIVE: See eval  Observation:   Test measurements:      RESTRICTIONS/PRECAUTIONS: DM 2, OA multiple joints, Hx of cancer - breast, HTN  (medication), Anxiety/ depression, B TKR, Right RTC repair, Hysterectomy     Exercises/Interventions:     Therapeutic Ex (33885) Sets/sec Reps Notes/CUES         Supine TA 5\" 10 x  HEP    Hip add / abd isos with TA Ball/ GTB 10 x  HEP   Bridge with TA 5\"  10 x  HEP   Piriformis stretch 20\"  3 x  HEP   Seated postural correction  5\" 10 x  HEP Manual Intervention ([id number])                                          NMR re-education (29304)   CUES NEEDED                                                         Therapeutic Activity (84941)                                          Access Code: [email or web address]  URL: [email or web address]  Date: 02/01/2023  Prepared by: Vicky Chicas     Exercises  Supine Piriformis Stretch with Foot on Ground - 1-2 x daily - 7 x weekly - 3 reps - 20 hold  Supine Transversus Abdominis Bracing - Hands on Ground - 1-2 x daily - 7 x weekly - 10 reps - 5 hold  Supine Hip Adduction Isometric with Ball - 1-2 x daily - 7 x weekly - 15 reps - 5 hold  Hooklying Abduction with Resistance - 1-2 x daily - 7 x weekly - 15 reps - 5 hold  Bridge - 1-2 x daily - 7 x weekly - 5-10 reps - 5 hold  Seated Upright Posture Correction - 1-2 x daily - 7 x weekly - 15 reps - 5 hold       Therapeutic Exercise and NMR EXR  [x] (23170) Provided verbal/tactile cueing for activities related to strengthening, flexibility, endurance, ROM  for improvements in proximal hip and core control with self care, mobility, lifting and ambulation.  [] (69921) Provided verbal/tactile cueing for activities related to improving balance, coordination, kinesthetic sense, posture, motor skill, proprioception  to assist with core control in self care, mobility, lifting, and ambulation.      Therapeutic Activities:    [] (57259 or 20183) Provided verbal/tactile cueing for activities related to improving balance, coordination, kinesthetic sense, posture, motor skill, proprioception and motor activation to allow for proper function  with self care and ADLs  [] (88751) Provided training and instruction to the patient for proper core and proximal hip recruitment and positioning with ambulation re-education     Home Exercise Program:    [x] (36164) Reviewed/Progressed HEP activities related to strengthening, flexibility, endurance, ROM of core, proximal hip and LE for functional self-care, mobility, lifting and ambulation   [] (43477) Reviewed/Progressed HEP activities related to improving balance, coordination, kinesthetic sense, posture, motor skill, proprioception of core, proximal hip and LE for self care, mobility, lifting, and ambulation      Manual Treatments:  PROM / STM / Oscillations-Mobs:  G-I, II, III, IV (PA's, Inf., Post.)  [] (07906) Provided manual therapy to mobilize proximal hip and LS spine soft tissue/joints for the purpose of modulating pain, promoting relaxation,  increasing ROM, reducing/eliminating soft tissue swelling/inflammation/restriction, improving soft tissue extensibility and allowing for proper ROM for normal function with self care, mobility, lifting and ambulation. Modalities:   Declined    Charges  Timed Code Treatment Minutes: 20   Total Treatment Minutes: 45     Medicare total (add KX at $2000)  125         [x] EVAL (LOW) 65799   [] EVAL (MOD) 40632   [] EVAL (HIGH) 21110   [] RE-EVAL     [x] HH(33673) x   1  [] IONTO  [] NMR (76548) x     [] VASO  [] Manual (92474) x      [] Other:  [] TA x      [] Mech Traction (85501)  [] ES(attended) (97344)      [] ES (un) (85432):     Goals:   Patient stated goal: Decrease pain. Able to tolerate sitting. Return to [employer]. [] Progressing: [] Met: [] Not Met: [] Adjusted     Therapist goals for Patient:   Short Term Goals: To be achieved in: 2 weeks  1. Independent in HEP and progression per patient tolerance, in order to prevent re-injury. [] Progressing: [] Met: [] Not Met: [] Adjusted  2. Patient will have a decrease in pain to facilitate improvement in movement, function, and ADLs as indicated by Functional Deficits. [] Progressing: [] Met: [] Not Met: [] Adjusted        Long Term Goals: To be achieved in: 10 weeks  1. Disability index score of 30% or less per Modified Oswestry to assist with reaching prior level of function. [] Progressing: [] Met: [] Not Met: [] Adjusted  2.  Patient will demonstrate increased AROM to WNL, good LS mobility, good hip ROM to allow for proper joint functioning as indicated by patients Functional Deficits. [] Progressing: [] Met: [] Not Met: [] Adjusted  3. Patient will demonstrate an increase in Strength to good proximal hip and core activation to allow for proper functional mobility as indicated by patients Functional Deficits. [] Progressing: [] Met: [] Not Met: [] Adjusted  4. Patient will return to tolerating standing and sitting positions for 30 minutes without increased symptoms or restriction no greater than 2/10. [] Progressing: [] Met: [] Not Met: [] Adjusted  5. Patient will return to water aerobics for general health and wellness with minimal to no symptoms or restriction. [] Progressing: [] Met: [] Not Met: [] Adjusted            Progression Towards Functional goals:  [] Patient is progressing as expected towards functional goals listed. [] Progression is slowed due to complexities listed. [] Progression has been slowed due to co-morbidities. [x] Plan just implemented, too soon to assess goals progression  [] Other:     Overall Progression Towards Functional goals/ Treatment Progress Update:  [] Patient is progressing as expected towards functional goals listed. [] Progression is slowed due to complexities/Impairments listed. [] Progression has been slowed due to co-morbidities.   [x] Plan just implemented, too soon to assess goals progression <30days   [] Goals require adjustment due to lack of progress  [] Patient is not progressing as expected and requires additional follow up with physician  [] Other    Prognosis for POC: [x] Good [] Fair  [] Poor      Patient requires continued skilled intervention: [x] Yes  [] No    Treatment/Activity Tolerance:  [x] Patient able to complete treatment  [] Patient limited by fatigue  [] Patient limited by pain    [] Patient limited by other medical complications  [] Other:     ASSESSMENT: See Eval        PLAN: See eval  [] Continue per plan of care [] Alter current plan (see comments above)  [x] Plan of care initiated [] Hold pending MD visit [] Discharge      Electronically signed by:  Brandie Jasso PT    Note: If patient does not return for scheduled/ recommended follow up visits, this note will serve as a discharge from care along with most recent update on progress.

## 2023-02-01 NOTE — PLAN OF CARE
[facility], [address], [address] [place] [address]  Phone: [phone]  Fax [phone]    Physical Therapy Certification    Dear  Juice Fontanez,     We had the pleasure of evaluating the following patient for physical therapy services at [address] [place] [address]. A summary of our findings can be found in the initial assessment below. This includes our plan of care. If you have any questions or concerns regarding these findings, please do not hesitate to contact me at the office phone number checked above. Thank you for the referral.       Physician Signature:_______________________________Date:__________________  By signing above (or electronic signature), therapists plan is approved by physician    Patient: Eduardo Dominguez   : 1940   MRN: [id number]  Referring Physician: BRENNA Honeycutt *      Evaluation Date: 2023      Medical Diagnosis Information:  Back pain [M54.9]   Treatment DX:  Lumbar spine pain M54.5; Difficulty walking R26.2                                      Insurance information:  MEDICARE       Precautions/ Contra-indications: DM 2, OA multiple joints, Hx of cancer - breast, HTN  (medication), Anxiety/ depression, B TKR, Right RTC repair, Hysterectomy     C-SSRS Triggered by Intake questionnaire (Past 2 wk assessment):   [x] No, Questionnaire did not trigger screening.   [] Yes, Patient intake triggered further evaluation      [] C-SSRS Screening completed  [] PCP notified via Plan of Care  [] Emergency services notified     Latex Allergy:  [x]NO      []YES  Preferred Language for Healthcare:   [x]English       []other:    SUBJECTIVE: Patient stated complaint:   Patient reports  woke up in the morning with increase lumbar pain. History of back injury in the [de-identified] with lifting her father. Patient reports intermittent episodes in past but last 15 years been doing well. Since injury having trouble sitting in softer surface. Most comfortable on kitchen chairs. Difficulty sleeping especially with sleeping. Feels best with knees bent up to chest.  Able to sleep once comfortable. Located centrally across belt line more on left. No radicular pain or buttock pain. Patient reports history of neuropathy and poor balance. Patient utilizes cane to walk. Patient denies any recent falls. Difficulty standing to check out of line. Able to do grocery shopping using cart. Tylenol or ibuprofen for pain control. Relevant Medical History:DM 2, OA multiple joints, Hx of cancer - breast, HTN  (medication), Anxiety/ depression, B TKR, Right RTC repair, Hysterectomy   Functional Disability Index/G-Codes:   OSW 25/50= 50%     Pain Scale: 5-8/10  Easing factors: activity modification, change of positions, heating pad, ice  Provocative factors: sit to stand transfers, sleeping, sitting soft surfaces, walking, standing, lifting, reaching, ovrhead movements. Bent over position.       Type: [x]Constant   []Intermittent  []Radiating []Localized []other:     Numbness/Tingling: bilateral neuropathy     Occupation/School: Retired    Living Status/Prior Level of Function: Independent with ADLs and IADLs, water aerobics [employer]     OBJECTIVE:     ROM     LUMBAR FLEX 100%    LUMBAR EXT 50%    Sidebend 50% challenging poor balance 50% challenging poor balance   Rotation      LEFT RIGHT   HIP Flex WNL WNL   HIP Abd WNL WNL   HIP ER WNL WNL   HIP IR WNL WNL   Knee Flex [facility]   Knee ext WNL WNL   Hamstring FLEX WNL WNL   Piriformis                Strength  LEFT RIGHT   MfA     TrA     HIP Flexors 4+ pn  4   HIP Abductors  4 4   HIP ER 4 4   Hip IR     Knee EXT (quad) 4+ 4+   Knee Flex (HS) 5 4+   Ankle DF 5 5   Ankle PF 5 5          Reflexes/Sensation:    [x]Dermatomes/Myotomes intact    []UE Reflexes     []Normal []Hypo      []Hyper   []LE Reflexes     []Normal []Hypo []Hyper   []Babinski/Clonus/Hoffmans:    []Other:    Joint mobility: hyper to normal mobility. Lumbar spine mobility not assessed. Held from getting patient into prone position   [x]Normal    []Hypo   []Hyper    Palpation: ttp lumbar spinous process L1-sacrum. PSIS bilaterally. Functional Mobility/Transfers: Independent. Difficulty with bed mobility secondary to pain    Posture: Forward center of mass. Bandages/Dressings/Incisions: NA    Gait: (include devices/WB status) Ambulates with posterior pelvic tilt, forward flexion. ER. Orthopedic Special Tests: Negative SLR                       [x] Patient history, allergies, meds reviewed. Medical chart reviewed. See intake form. Review Of Systems (ROS):  [x]Performed Review of systems (Integumentary, CardioPulmonary, Neurological) by intake and observation. Intake form has been scanned into medical record. Patient has been instructed to contact their primary care physician regarding ROS issues if not already being addressed at this time.       Co-morbidities/Complexities (which will affect course of rehabilitation):   []None           Arthritic conditions   []Rheumatoid arthritis (M05.9)  [x]Osteoarthritis (M19.91)   Cardiovascular conditions   [x]Hypertension (I10)  []Hyperlipidemia (E78.5)  []Angina pectoris (I20)  []Atherosclerosis (I70)   Musculoskeletal conditions   []Disc pathology   []Congenital spine pathologies   [x]Prior surgical intervention  []Osteoporosis (M81.8)  []Osteopenia (M85.8)   Endocrine conditions   []Hypothyroid (E03.9)  []Hyperthyroid Gastrointestinal conditions   []Constipation (E09.97)   Metabolic conditions   [x]Morbid obesity (E66.01)  [x]Diabetes type 1(E10.65) or 2 (E11.65)   [x]Neuropathy (G60.9)     Pulmonary conditions   []Asthma (J45)  []Coughing   []COPD (J44.9)   Psychological Disorders  [x]Anxiety (F41.9)  [x]Depression (F32.9)   []Other:   [x]Other:     B TKR, Right RTC repair, Hysterectomy      Barriers to/and or personal factors that will affect rehab potential:              []Age  []Sex              []Motivation/Lack of Motivation                        [x]Co-Morbidities              []Cognitive Function, education/learning barriers              []Environmental, home barriers              []profession/work barriers  []past PT/medical experience  []other:  Justification: Patient highly motivated and eager to return to PLOF. Patient with several co-morbidities but should benefit well from PT with minimal barriers to rehab. Falls Risk Assessment (30 days): POC to include LE strengthening, balance, gait, and functional task to improve balance and decrease risks of falls  [] Falls Risk assessed and no intervention required.   [x] Falls Risk assessed and Patient requires intervention due to being higher risk   TUG score (>12s at risk):     [] Falls education provided, including         ASSESSMENT:   Functional Impairments:     []Noted lumbar/proximal hip hypomobility   [x]Noted lumbosacral and/or generalized hypermobility   [x]Decreased Lumbosacral/hip/LE functional ROM   [x]Decreased core/proximal hip strength and neuromuscular control    [x]Decreased LE functional strength    []Abnormal reflexes/sensation/myotomal/dermatomal deficits  [x]Reduced balance/proprioceptive control    []other:      Functional Activity Limitations (from functional questionnaire and intake)   [x]Reduced ability to tolerate prolonged functional positions   [x]Reduced ability or difficulty with changes of positions or transfers between positions   [x]Reduced ability to maintain good posture and demonstrate good body mechanics with sitting, bending, and lifting   [x]Reduced ability to sleep   [x] Reduced ability or tolerance with driving and/or computer work   [x]Reduced ability to perform lifting, reaching, carrying tasks   [x]Reduced ability to squat   [x]Reduced ability to forward bend   [x]Reduced ability to ambulate prolonged functional periods/distances/surfaces   [x]Reduced ability to ascend/descend stairs   []other:       Participation Restrictions   [x]Reduced participation in self care activities   [x]Reduced participation in home management activities   []Reduced participation in work activities   [x]Reduced participation in social activities. []Reduced participation in sport/recreation activities. Classification:   [x]Signs/symptoms consistent with Lumbar instability/stabilization subgroup. []Signs/symptoms consistent with Lumbar mobilization/manipulation subgroup, myotomes and dermatomes intact. Meets manipulation criteria. []Signs/symptoms consistent with Lumbar direction specific/centralization subgroup   []Signs/symptoms consistent with Lumbar traction subgroup     []Signs/symptoms consistent with lumbar facet dysfunction   [x]Signs/symptoms consistent with lumbar stenosis type dysfunction   []Signs/symptoms consistent with nerve root involvement including myotome & dermatome dysfunction   []Signs/symptoms consistent with post-surgical status including: decreased ROM, strength and function.    []signs/symptoms consistent with pathology which may benefit from Dry needling     []other:      Prognosis/Rehab Potential:      []Excellent   [x]Good    []Fair   []Poor    Tolerance of evaluation/treatment:    []Excellent   [x]Good    []Fair   []Poor  Physical Therapy Evaluation Complexity Justification  [x] A history of present problem with:  [] no personal factors and/or comorbidities that impact the plan of care;  []1-2 personal factors and/or comorbidities that impact the plan of care  [x]3 personal factors and/or comorbidities that impact the plan of care  [x] An examination of body systems using standardized tests and measures addressing any of the following: body structures and functions (impairments), activity limitations, and/or participation restrictions;:  [] a total of 1-2 or more elements   [x] a total of 3 or more elements   [] a total of 4 or more elements   [x] A clinical presentation with:  [x] stable and/or uncomplicated characteristics   [] evolving clinical presentation with changing characteristics  [] unstable and unpredictable characteristics;   [x] Clinical decision making of [x] low, [] moderate, [] high complexity using standardized patient assessment instrument and/or measurable assessment of functional outcome. [x] EVAL (LOW) 91403 (typically 20 minutes face-to-face)  [] EVAL (MOD) 13548 (typically 30 minutes face-to-face)  [] EVAL (HIGH) 65752 (typically 45 minutes face-to-face)  [] RE-EVAL         PLAN: Begin PT focusing on: proximal hip mobilizations, LB mobs, LB core activation, proximal hip activation, and HEP    Frequency/Duration:  2 days per week for 10 Weeks:  Interventions:  [x]  Therapeutic exercise including: strength training, ROM, for LE, Glutes and core   [x]  NMR activation and proprioception for glutes , LE and Core   [x]  Manual therapy as indicated for Hip complex, LE and spine to include: Dry Needling/IASTM, STM, PROM, Gr I-IV mobilizations, manipulation. [x]  Modalities as needed that may include: thermal agents, E-stim, Biofeedback, US, iontophoresis as indicated  [x]  Patient education on joint protection, postural re-education, activity modification, progression of HEP. HEP instruction:   Access Code: [email or web address]: [email or web address]  Date: 02/01/2023  Prepared by: Carmen Rodriguez    Exercises  Supine Piriformis Stretch with Foot on Ground - 1-2 x daily - 7 x weekly - 3 reps - 20 hold  Supine Transversus Abdominis Bracing - Hands on Ground - 1-2 x daily - 7 x weekly - 10 reps - 5 hold  Supine Hip Adduction Isometric with Ball - 1-2 x daily - 7 x weekly - 15 reps - 5 hold  Hooklying Abduction with Resistance - 1-2 x daily - 7 x weekly - 15 reps - 5 hold  Bridge - 1-2 x daily - 7 x weekly - 5-10 reps - 5 hold  Seated Upright Posture Correction - 1-2 x daily - 7 x weekly - 15 reps - 5 hold    GOALS:  Patient stated goal: Decrease pain. Able to tolerate sitting. Return to [employer]. [] Progressing: [] Met: [] Not Met: [] Adjusted    Therapist goals for Patient:   Short Term Goals: To be achieved in: 2 weeks  1. Independent in HEP and progression per patient tolerance, in order to prevent re-injury. [] Progressing: [] Met: [] Not Met: [] Adjusted  2. Patient will have a decrease in pain to facilitate improvement in movement, function, and ADLs as indicated by Functional Deficits. [] Progressing: [] Met: [] Not Met: [] Adjusted      Long Term Goals: To be achieved in: 10 weeks  1. Disability index score of 30% or less per Modified Oswestry to assist with reaching prior level of function. [] Progressing: [] Met: [] Not Met: [] Adjusted  2. Patient will demonstrate increased AROM to WNL, good LS mobility, good hip ROM to allow for proper joint functioning as indicated by patients Functional Deficits. [] Progressing: [] Met: [] Not Met: [] Adjusted  3. Patient will demonstrate an increase in Strength to good proximal hip and core activation to allow for proper functional mobility as indicated by patients Functional Deficits. [] Progressing: [] Met: [] Not Met: [] Adjusted  4. Patient will return to tolerating standing and sitting positions for 30 minutes without increased symptoms or restriction no greater than 2/10. [] Progressing: [] Met: [] Not Met: [] Adjusted  5. Patient will return to water aerobics for general health and wellness with minimal to no symptoms or restriction.    [] Progressing: [] Met: [] Not Met: [] Adjusted       Electronically signed by:  Katelyn Almazane 429

## 2023-02-03 ENCOUNTER — HOSPITAL ENCOUNTER (OUTPATIENT)
Dept: PHYSICAL THERAPY | Age: 83
Setting detail: THERAPIES SERIES
Discharge: HOME | End: 2023-02-03
Payer: MEDICARE

## 2023-02-03 PROCEDURE — 97110 THERAPEUTIC EXERCISES: CPT | Performed by: PHYSICAL THERAPIST

## 2023-02-03 PROCEDURE — 97140 MANUAL THERAPY 1/> REGIONS: CPT | Performed by: PHYSICAL THERAPIST

## 2023-02-03 NOTE — FLOWSHEET NOTE
[facility], [address] [place]  Phone: [phone]  Fax [phone]    Physical Therapy Treatment Note/ Progress Report:           Date:  2/3/2023    Patient Name:  Ross Landaverde    :    MRN: [id number]  Restrictions/Precautions:    Medical/Treatment Diagnosis Information:  Back pain [M54.9]         Treatment DX:  Lumbar spine pain M54.5; Difficulty walking R26.2                                      Insurance information:  MEDICARE  Physician Information:  BRENNA Johnson *  Has the plan of care been signed (Y/N):        []  Yes  [x]  No     Date of Patient follow up with Physician:       Is this a Progress Report:     []  Yes  [x]  No        If Yes:  Date Range for reporting period:  Beginning 23  Ending    Progress report will be due (10 Rx or 30 days whichever is less): 24       Recertification will be due (POC Duration  / 90 days whichever is less): 10 weeks POC        Visit # Insurance Allowable Auth Required   In-person 2 [facility] []  Yes [x]  No    Telehealth   []  Yes []  No    Total            Functional Scale: OSW 50%    Date assessed:  23      Therapy Diagnosis/Practice Pattern:F      Number of Comorbidities:  []0     []1-2    [x]3+    Latex Allergy:  [x]NO      []YES  Preferred Language for Healthcare:   [x]English       []other:      Pain level:  5-8/10    SUBJECTIVE: Attempted to use pillows which allowed her to sit on soft couch for short period. Patient reports slept good last night. Sore after initial session. Hardest in bridge exercise. No real change in symptoms at this time.      OBJECTIVE: See eval  Observation:   Test measurements:      RESTRICTIONS/PRECAUTIONS: DM 2, OA multiple joints, Hx of cancer - breast, HTN  (medication), Anxiety/ depression, B TKR, Right RTC repair, Hysterectomy     Exercises/Interventions:     Therapeutic Ex (12976) Sets/sec Reps Notes/CUES   Bike X 5 min     Supine TA 5\" 10 x  HEP    HEP   Bridge with TA 5\"  10 x  HEP   HEP   Seated postural correction  5\" 10 x  HEP   TA with march   10 x     SB LTR/ DKTC 5\"  10 x ea    BKFO with TA GVL 5\"  10 x     Clamshells 5\"  15 x     Prone glute squeeze 5\"  10 x     TB row/ TB extension with TA GTB  15 x ea SBA - poor balance- seated NV         Manual Intervention (78485)      SKTC/ Manual HS stretch   X 4 min     Prone segmental distraction/ STM to paraspinals  X 4 min                             NMR re-education (65609)   CUES NEEDED                                                         Therapeutic Activity (16366)                                          Access Code: [email or web address]  URL: [email or web address]  Date: 02/01/2023  Prepared by: Sulaiman Lewis     Exercises  Supine Piriformis Stretch with Foot on Ground - 1-2 x daily - 7 x weekly - 3 reps - 20 hold  Supine Transversus Abdominis Bracing - Hands on Ground - 1-2 x daily - 7 x weekly - 10 reps - 5 hold  Supine Hip Adduction Isometric with Ball - 1-2 x daily - 7 x weekly - 15 reps - 5 hold  Hooklying Abduction with Resistance - 1-2 x daily - 7 x weekly - 15 reps - 5 hold  Bridge - 1-2 x daily - 7 x weekly - 5-10 reps - 5 hold  Seated Upright Posture Correction - 1-2 x daily - 7 x weekly - 15 reps - 5 hold       Therapeutic Exercise and NMR EXR  [x] (31127) Provided verbal/tactile cueing for activities related to strengthening, flexibility, endurance, ROM  for improvements in proximal hip and core control with self care, mobility, lifting and ambulation.  [] (41191) Provided verbal/tactile cueing for activities related to improving balance, coordination, kinesthetic sense, posture, motor skill, proprioception  to assist with core control in self care, mobility, lifting, and ambulation.      Therapeutic Activities:    [] (45056 or 02803) Provided verbal/tactile cueing for activities related to improving balance, coordination, kinesthetic sense, posture, motor skill, proprioception and motor activation to allow for proper function  with self care and ADLs  [] (32245) Provided training and instruction to the patient for proper core and proximal hip recruitment and positioning with ambulation re-education     Home Exercise Program:    [x] (88604) Reviewed/Progressed HEP activities related to strengthening, flexibility, endurance, ROM of core, proximal hip and LE for functional self-care, mobility, lifting and ambulation   [] (79515) Reviewed/Progressed HEP activities related to improving balance, coordination, kinesthetic sense, posture, motor skill, proprioception of core, proximal hip and LE for self care, mobility, lifting, and ambulation      Manual Treatments:  PROM / STM / Oscillations-Mobs:  G-I, II, III, IV (PA's, Inf., Post.)  [x] (03942) Provided manual therapy to mobilize proximal hip and LS spine soft tissue/joints for the purpose of modulating pain, promoting relaxation,  increasing ROM, reducing/eliminating soft tissue swelling/inflammation/restriction, improving soft tissue extensibility and allowing for proper ROM for normal function with self care, mobility, lifting and ambulation. Modalities:   Hook lying MHP x 10 minutes    Charges  Timed Code Treatment Minutes: 35   Total Treatment Minutes: 35     Medicare total (add KX at $2000)  181         [] EVAL (LOW) 44456   [] EVAL (MOD) 53421   [] EVAL (HIGH) 21744   [] RE-EVAL     [x] HA(54617) x   1  [] IONTO  [] NMR (28754) x     [] VASO  [x] Manual (26039) x   1   [] Other:  [] TA x      [] Mech Traction (51053)  [] ES(attended) (23961)      [] ES (un) (46888):     Goals:   Patient stated goal: Decrease pain. Able to tolerate sitting. Return to [employer]. [] Progressing: [] Met: [] Not Met: [] Adjusted     Therapist goals for Patient:   Short Term Goals: To be achieved in: 2 weeks  1. Independent in HEP and progression per patient tolerance, in order to prevent re-injury.    [] Progressing: [] Met: [] Not Met: [] Adjusted  2. Patient will have a decrease in pain to facilitate improvement in movement, function, and ADLs as indicated by Functional Deficits.  [] Progressing: [] Met: [] Not Met: [] Adjusted        Long Term Goals: To be achieved in: 10 weeks  1. Disability index score of 30% or less per Modified Oswestry to assist with reaching prior level of function.   [] Progressing: [] Met: [] Not Met: [] Adjusted  2. Patient will demonstrate increased AROM to WNL, good LS mobility, good hip ROM to allow for proper joint functioning as indicated by patients Functional Deficits.   [] Progressing: [] Met: [] Not Met: [] Adjusted  3. Patient will demonstrate an increase in Strength to good proximal hip and core activation to allow for proper functional mobility as indicated by patients Functional Deficits.   [] Progressing: [] Met: [] Not Met: [] Adjusted  4. Patient will return to tolerating standing and sitting positions for 30 minutes without increased symptoms or restriction no greater than 2/10.    [] Progressing: [] Met: [] Not Met: [] Adjusted  5. Patient will return to water aerobics for general health and wellness with minimal to no symptoms or restriction.   [] Progressing: [] Met: [] Not Met: [] Adjusted            Progression Towards Functional goals:  [] Patient is progressing as expected towards functional goals listed.    [] Progression is slowed due to complexities listed.  [] Progression has been slowed due to co-morbidities.  [x] Plan just implemented, too soon to assess goals progression  [] Other:     Overall Progression Towards Functional goals/ Treatment Progress Update:  [] Patient is progressing as expected towards functional goals listed.    [] Progression is slowed due to complexities/Impairments listed.  [] Progression has been slowed due to co-morbidities.  [x] Plan just implemented, too soon to assess goals progression <30days   [] Goals require adjustment due to lack of progress  []  Patient is not progressing as expected and requires additional follow up with physician  [] Other    Prognosis for POC: [x] Good [] Fair  [] Poor      Patient requires continued skilled intervention: [x] Yes  [] No    Treatment/Activity Tolerance:  [x] Patient able to complete treatment  [] Patient limited by fatigue  [] Patient limited by pain    [] Patient limited by other medical complications  [] Other:     ASSESSMENT: *Difficulty getting into prone position. Poor standing balance. Continue to work on core stabilization as patient tolerates. PLAN: See eval  [x] Continue per plan of care [] Alter current plan (see comments above)  [] Plan of care initiated [] Hold pending MD visit [] Discharge      Electronically signed by:  Lola Lozada PT    Note: If patient does not return for scheduled/ recommended follow up visits, this note will serve as a discharge from care along with most recent update on progress.

## 2023-02-06 ENCOUNTER — HOSPITAL ENCOUNTER (OUTPATIENT)
Dept: PHYSICAL THERAPY | Age: 83
Setting detail: THERAPIES SERIES
Discharge: HOME | End: 2023-02-06
Payer: MEDICARE

## 2023-02-06 PROCEDURE — 97110 THERAPEUTIC EXERCISES: CPT | Performed by: PHYSICAL THERAPIST

## 2023-02-06 PROCEDURE — 97140 MANUAL THERAPY 1/> REGIONS: CPT | Performed by: PHYSICAL THERAPIST

## 2023-02-06 NOTE — FLOWSHEET NOTE
[facility], [address] [place]  Phone: [phone]  Fax [phone]    Physical Therapy Treatment Note/ Progress Report:           Date:  2023    Patient Name:  Ashley Myers    :    MRN: [id number]  Restrictions/Precautions:    Medical/Treatment Diagnosis Information:  Back pain [M54.9]         Treatment DX:  Lumbar spine pain M54.5; Difficulty walking R26.2                                      Insurance information:  MEDICARE  Physician Information:  BRENNA Parker *  Has the plan of care been signed (Y/N):        []  Yes  [x]  No     Date of Patient follow up with Physician:       Is this a Progress Report:     []  Yes  [x]  No        If Yes:  Date Range for reporting period:  Beginning 23  Ending    Progress report will be due (10 Rx or 30 days whichever is less): 00       Recertification will be due (POC Duration  / 90 days whichever is less): 10 weeks POC        Visit # Insurance Allowable Auth Required   In-person 3 [address] []  Yes [x]  No    Telehealth   []  Yes []  No    Total            Functional Scale: OSW 50%    Date assessed:  23      Therapy Diagnosis/Practice Pattern:F      Number of Comorbidities:  []0     []1-2    [x]3+    Latex Allergy:  [x]NO      []YES  Preferred Language for Healthcare:   [x]English       []other:      Pain level:  5-8/10    SUBJECTIVE: Patient reports had a good weekend in regards to back. Able to sit on couch for longer period of time. Woke up this morning with increase in stiffness across low back. Slept well last night. Usually side sleeper.      OBJECTIVE: See eval  Observation:   Test measurements:      RESTRICTIONS/PRECAUTIONS: DM 2, OA multiple joints, Hx of cancer - breast, HTN  (medication), Anxiety/ depression, B TKR, Right RTC repair, Hysterectomy     Exercises/Interventions:     Therapeutic Ex (38877) Sets/sec Reps Notes/CUES   Bike  X 5 min     Supine TA HEP    HEP   Bridge with TA 5\"  10 x  HEP   HEP   Seated postural correction  5\" 10 x  HEP   TA with march   10 x     SB LTR/ DKTC 5\"  10 x ea    BKFO with TA GVL 5\"  10 x     Clamshells 5\"  15 x     Prone glute squeeze 5\"  10 x     TB row/SEATED GTB  20x ea Seated SB flexion roll out  10 x     Sit to stand  10 x           Manual Intervention (80166)      SKTC/ Manual HS stretch   X 4 min     Prone segmental distraction/ STM to paraspinals  X 4 min     Side lying rotational mobs  X 1 min bilat                      NMR re-education (51817)   CUES NEEDED                                                         Therapeutic Activity (40227)                                          Access Code: [email or web address]  URL: [email or web address]  Date: 02/01/2023  Prepared by: Genia Lee     Exercises  Supine Piriformis Stretch with Foot on Ground - 1-2 x daily - 7 x weekly - 3 reps - 20 hold  Supine Transversus Abdominis Bracing - Hands on Ground - 1-2 x daily - 7 x weekly - 10 reps - 5 hold  Supine Hip Adduction Isometric with Ball - 1-2 x daily - 7 x weekly - 15 reps - 5 hold  Hooklying Abduction with Resistance - 1-2 x daily - 7 x weekly - 15 reps - 5 hold  Bridge - 1-2 x daily - 7 x weekly - 5-10 reps - 5 hold  Seated Upright Posture Correction - 1-2 x daily - 7 x weekly - 15 reps - 5 hold       Therapeutic Exercise and NMR EXR  [x] (90115) Provided verbal/tactile cueing for activities related to strengthening, flexibility, endurance, ROM  for improvements in proximal hip and core control with self care, mobility, lifting and ambulation.  [] (66156) Provided verbal/tactile cueing for activities related to improving balance, coordination, kinesthetic sense, posture, motor skill, proprioception  to assist with core control in self care, mobility, lifting, and ambulation.      Therapeutic Activities:    [] (35491 or 33983) Provided verbal/tactile cueing for activities related to improving balance, coordination, kinesthetic sense, posture, motor skill, proprioception and motor activation to allow for proper function  with self care and ADLs  [] (44259) Provided training and instruction to the patient for proper core and proximal hip recruitment and positioning with ambulation re-education     Home Exercise Program:    [x] (91375) Reviewed/Progressed HEP activities related to strengthening, flexibility, endurance, ROM of core, proximal hip and LE for functional self-care, mobility, lifting and ambulation   [] (44984) Reviewed/Progressed HEP activities related to improving balance, coordination, kinesthetic sense, posture, motor skill, proprioception of core, proximal hip and LE for self care, mobility, lifting, and ambulation      Manual Treatments:  PROM / STM / Oscillations-Mobs:  G-I, II, III, IV (PA's, Inf., Post.)  [x] (67530) Provided manual therapy to mobilize proximal hip and LS spine soft tissue/joints for the purpose of modulating pain, promoting relaxation,  increasing ROM, reducing/eliminating soft tissue swelling/inflammation/restriction, improving soft tissue extensibility and allowing for proper ROM for normal function with self care, mobility, lifting and ambulation. Modalities:   Hook lying MHP x 10 minutes    Charges  Timed Code Treatment Minutes: 40   Total Treatment Minutes: 50     Medicare total (add KX at $2000)  266         [] EVAL (LOW) 98896   [] EVAL (MOD) 25654   [] EVAL (HIGH) 07786   [] RE-EVAL     [x] IP(84317) x   2  [] IONTO  [] NMR (48340) x     [] VASO  [x] Manual (31306) x   1   [] Other:  [] TA x      [] Mech Traction (77437)  [] ES(attended) (71010)      [] ES (un) (20268):     Goals:   Patient stated goal: Decrease pain. Able to tolerate sitting. Return to [employer]. [] Progressing: [] Met: [] Not Met: [] Adjusted     Therapist goals for Patient:   Short Term Goals: To be achieved in: 2 weeks  1.  Independent in HEP and progression per patient tolerance, in order to prevent re-injury. [] Progressing: [] Met: [] Not Met: [] Adjusted  2. Patient will have a decrease in pain to facilitate improvement in movement, function, and ADLs as indicated by Functional Deficits. [] Progressing: [] Met: [] Not Met: [] Adjusted        Long Term Goals: To be achieved in: 10 weeks  1. Disability index score of 30% or less per Modified Oswestry to assist with reaching prior level of function. [] Progressing: [] Met: [] Not Met: [] Adjusted  2. Patient will demonstrate increased AROM to WNL, good LS mobility, good hip ROM to allow for proper joint functioning as indicated by patients Functional Deficits. [] Progressing: [] Met: [] Not Met: [] Adjusted  3. Patient will demonstrate an increase in Strength to good proximal hip and core activation to allow for proper functional mobility as indicated by patients Functional Deficits. [] Progressing: [] Met: [] Not Met: [] Adjusted  4. Patient will return to tolerating standing and sitting positions for 30 minutes without increased symptoms or restriction no greater than 2/10. [] Progressing: [] Met: [] Not Met: [] Adjusted  5. Patient will return to water aerobics for general health and wellness with minimal to no symptoms or restriction. [] Progressing: [] Met: [] Not Met: [] Adjusted            Progression Towards Functional goals:  [] Patient is progressing as expected towards functional goals listed. [] Progression is slowed due to complexities listed. [] Progression has been slowed due to co-morbidities. [x] Plan just implemented, too soon to assess goals progression  [] Other:     Overall Progression Towards Functional goals/ Treatment Progress Update:  [] Patient is progressing as expected towards functional goals listed. [] Progression is slowed due to complexities/Impairments listed. [] Progression has been slowed due to co-morbidities.   [x] Plan just implemented, too soon to assess goals progression <30days   [] Goals require adjustment due to lack of progress  [] Patient is not progressing as expected and requires additional follow up with physician  [] Other    Prognosis for POC: [x] Good [] Fair  [] Poor      Patient requires continued skilled intervention: [x] Yes  [] No    Treatment/Activity Tolerance:  [x] Patient able to complete treatment  [] Patient limited by fatigue  [] Patient limited by pain    [] Patient limited by other medical complications  [] Other:     ASSESSMENT: Incorporate standing and balance activities into the program as patient tolerates. Denied any lumbar pain following session today. Continue to work on core stabilization as patient tolerates. PLAN: See eval  [x] Continue per plan of care [] Alter current plan (see comments above)  [] Plan of care initiated [] Hold pending MD visit [] Discharge      Electronically signed by:  Teresa Aguilar PT    Note: If patient does not return for scheduled/ recommended follow up visits, this note will serve as a discharge from care along with most recent update on progress.

## 2023-02-09 ENCOUNTER — HOSPITAL ENCOUNTER (OUTPATIENT)
Dept: PHYSICAL THERAPY | Age: 83
Setting detail: THERAPIES SERIES
Discharge: HOME | End: 2023-02-09
Payer: MEDICARE

## 2023-02-09 PROCEDURE — 97110 THERAPEUTIC EXERCISES: CPT | Performed by: PHYSICAL THERAPIST

## 2023-02-09 PROCEDURE — 97140 MANUAL THERAPY 1/> REGIONS: CPT | Performed by: PHYSICAL THERAPIST

## 2023-02-09 NOTE — FLOWSHEET NOTE
[facility], [address] [place]  Phone: [phone]  Fax [phone]    Physical Therapy Treatment Note/ Progress Report:           Date:  2023    Patient Name:  Ashley Myers    :  [birth year]  MRN: [id number]  Restrictions/Precautions:    Medical/Treatment Diagnosis Information:  Back pain [M54.9]         Treatment DX:  Lumbar spine pain M54.5; Difficulty walking R26.2                                      Insurance information:  MEDICARE  Physician Information:  BRENNA Parker *  Has the plan of care been signed (Y/N):        []  Yes  [x]  No     Date of Patient follow up with Physician:       Is this a Progress Report:     []  Yes  [x]  No        If Yes:  Date Range for reporting period:  Beginning 23  Ending    Progress report will be due (10 Rx or 30 days whichever is less): 48       Recertification will be due (POC Duration  / 90 days whichever is less): 10 weeks POC        Visit # Insurance Allowable Auth Required   In-person [address] []  Yes [x]  No    Telehealth   []  Yes []  No    Total            Functional Scale: OSW 50%    Date assessed:  23      Therapy Diagnosis/Practice Pattern:F      Number of Comorbidities:  []0     []1-2    [x]3+    Latex Allergy:  [x]NO      []YES  Preferred Language for Healthcare:   [x]English       []other:      Pain level:  5-8/10    SUBJECTIVE: Patient reports noticing improvement the past few days. Able to sit on couch for longer period of time and tolerate it well. Patient did notice right side back pain sitting in waiting room.      OBJECTIVE: See eval  Observation:   Test measurements:      RESTRICTIONS/PRECAUTIONS: DM 2, OA multiple joints, Hx of cancer - breast, HTN  (medication), Anxiety/ depression, B TKR, Right RTC repair, Hysterectomy     Exercises/Interventions:     Therapeutic Ex (07162) Sets/sec Reps Notes/CUES   Bike  X 5 min     Supine TA HEP HEP   Bridge with TA SB 5\"  10 x  HEP   HEP   HEP   TA with march   10 x     SB LTR/ DKTC 5\"  10 x ea    BKFO with TA GVL 5\"  10 x     Clamshells 5\"  15 x     Prone glute squeeze 5\"  10 x     TB row/SEATED GTB  30x ea Seated SB flexion roll out  20 x     Sit to stand   x 10    Hip ABD standing  2 x 10                 Manual Intervention (95509)      SKTC/ Manual HS stretch   X 4 min     Prone segmental distraction/ STM to paraspinals  X 4 min     Side lying rotational mobs  X 1 min bilat                      NMR re-education (53906)   CUES NEEDED   Lateral stepping along wall   1 lap No hands   Semi tandem   2 x 20\"     DLS with head turns  X 1 min                                         Therapeutic Activity (70059)                                          Access Code: [email or web address]  URL: [email or web address]  Date: 02/01/2023  Prepared by: Malgorzata Coon     Exercises  Supine Piriformis Stretch with Foot on Ground - 1-2 x daily - 7 x weekly - 3 reps - 20 hold  Supine Transversus Abdominis Bracing - Hands on Ground - 1-2 x daily - 7 x weekly - 10 reps - 5 hold  Supine Hip Adduction Isometric with Ball - 1-2 x daily - 7 x weekly - 15 reps - 5 hold  Hooklying Abduction with Resistance - 1-2 x daily - 7 x weekly - 15 reps - 5 hold  Bridge - 1-2 x daily - 7 x weekly - 5-10 reps - 5 hold  Seated Upright Posture Correction - 1-2 x daily - 7 x weekly - 15 reps - 5 hold       Therapeutic Exercise and NMR EXR  [x] (88707) Provided verbal/tactile cueing for activities related to strengthening, flexibility, endurance, ROM  for improvements in proximal hip and core control with self care, mobility, lifting and ambulation.  [] (67730) Provided verbal/tactile cueing for activities related to improving balance, coordination, kinesthetic sense, posture, motor skill, proprioception  to assist with core control in self care, mobility, lifting, and ambulation.      Therapeutic Activities:    [] (48089 or 37775) Provided verbal/tactile cueing for activities related to improving balance, coordination, kinesthetic sense, posture, motor skill, proprioception and motor activation to allow for proper function  with self care and ADLs  [] (53525) Provided training and instruction to the patient for proper core and proximal hip recruitment and positioning with ambulation re-education     Home Exercise Program:    [x] (93625) Reviewed/Progressed HEP activities related to strengthening, flexibility, endurance, ROM of core, proximal hip and LE for functional self-care, mobility, lifting and ambulation   [] (72943) Reviewed/Progressed HEP activities related to improving balance, coordination, kinesthetic sense, posture, motor skill, proprioception of core, proximal hip and LE for self care, mobility, lifting, and ambulation      Manual Treatments:  PROM / STM / Oscillations-Mobs:  G-I, II, III, IV (PA's, Inf., Post.)  [x] (68860) Provided manual therapy to mobilize proximal hip and LS spine soft tissue/joints for the purpose of modulating pain, promoting relaxation,  increasing ROM, reducing/eliminating soft tissue swelling/inflammation/restriction, improving soft tissue extensibility and allowing for proper ROM for normal function with self care, mobility, lifting and ambulation. Modalities:   Hook lying MHP x 10 minutes    Charges  Timed Code Treatment Minutes: 40   Total Treatment Minutes: 50     Medicare total (add KX at $2000)  351         [] EVAL (LOW) 21655   [] EVAL (MOD) 53172   [] EVAL (HIGH) 67555   [] RE-EVAL     [x] IB(08382) x   2  [] IONTO  [] NMR (29941) x     [] VASO  [x] Manual (56837) x   1   [] Other:  [] TA x      [] Mech Traction (18785)  [] ES(attended) (74586)      [] ES (un) (67888):     Goals:   Patient stated goal: Decrease pain. Able to tolerate sitting. Return to [employer]. [] Progressing: [] Met: [] Not Met: [] Adjusted     Therapist goals for Patient:   Short Term Goals: To be achieved in: 2 weeks  1. Independent in HEP and progression per patient tolerance, in order to prevent re-injury. [] Progressing: [] Met: [] Not Met: [] Adjusted  2. Patient will have a decrease in pain to facilitate improvement in movement, function, and ADLs as indicated by Functional Deficits. [] Progressing: [] Met: [] Not Met: [] Adjusted        Long Term Goals: To be achieved in: 10 weeks  1. Disability index score of 30% or less per Modified Oswestry to assist with reaching prior level of function. [] Progressing: [] Met: [] Not Met: [] Adjusted  2. Patient will demonstrate increased AROM to WNL, good LS mobility, good hip ROM to allow for proper joint functioning as indicated by patients Functional Deficits. [] Progressing: [] Met: [] Not Met: [] Adjusted  3. Patient will demonstrate an increase in Strength to good proximal hip and core activation to allow for proper functional mobility as indicated by patients Functional Deficits. [] Progressing: [] Met: [] Not Met: [] Adjusted  4. Patient will return to tolerating standing and sitting positions for 30 minutes without increased symptoms or restriction no greater than 2/10. [] Progressing: [] Met: [] Not Met: [] Adjusted  5. Patient will return to water aerobics for general health and wellness with minimal to no symptoms or restriction. [] Progressing: [] Met: [] Not Met: [] Adjusted            Progression Towards Functional goals:  [] Patient is progressing as expected towards functional goals listed. [] Progression is slowed due to complexities listed. [] Progression has been slowed due to co-morbidities. [x] Plan just implemented, too soon to assess goals progression  [] Other:     Overall Progression Towards Functional goals/ Treatment Progress Update:  [] Patient is progressing as expected towards functional goals listed. [] Progression is slowed due to complexities/Impairments listed. [] Progression has been slowed due to co-morbidities.   [x] Plan just implemented, too soon to assess goals progression <30days   [] Goals require adjustment due to lack of progress  [] Patient is not progressing as expected and requires additional follow up with physician  [] Other    Prognosis for POC: [x] Good [] Fair  [] Poor      Patient requires continued skilled intervention: [x] Yes  [] No    Treatment/Activity Tolerance:  [x] Patient able to complete treatment  [] Patient limited by fatigue  [] Patient limited by pain    [] Patient limited by other medical complications  [] Other:     ASSESSMENT: Incorporate standing and balance activities into the program as patient tolerates. Challenged with standing balance. Patient has pain with prone PA but responds well to distraction. Advanced HEP. PLAN: See eval  [x] Continue per plan of care [] Alter current plan (see comments above)  [] Plan of care initiated [] Hold pending MD visit [] Discharge      Electronically signed by:  Dong Everett PT    Note: If patient does not return for scheduled/ recommended follow up visits, this note will serve as a discharge from care along with most recent update on progress.

## 2023-02-13 ENCOUNTER — HOSPITAL ENCOUNTER (OUTPATIENT)
Dept: PHYSICAL THERAPY | Age: 83
Setting detail: THERAPIES SERIES
Discharge: HOME | End: 2023-02-13
Payer: MEDICARE

## 2023-02-13 PROCEDURE — 97140 MANUAL THERAPY 1/> REGIONS: CPT | Performed by: PHYSICAL THERAPIST

## 2023-02-13 PROCEDURE — 97110 THERAPEUTIC EXERCISES: CPT | Performed by: PHYSICAL THERAPIST

## 2023-02-13 NOTE — FLOWSHEET NOTE
[facility], [address] [place]  Phone: [phone]  Fax [phone]    Physical Therapy Treatment Note/ Progress Report:           Date:  2023    Patient Name:  Dariana Zamarripa    :    MRN: [id number]  Restrictions/Precautions:    Medical/Treatment Diagnosis Information:  Back pain [M54.9]         Treatment DX:  Lumbar spine pain M54.5; Difficulty walking R26.2                                      Insurance information:  MEDICARE  Physician Information:  BRENNA Rosario *  Has the plan of care been signed (Y/N):        []  Yes  [x]  No     Date of Patient follow up with Physician:       Is this a Progress Report:     []  Yes  [x]  No        If Yes:  Date Range for reporting period:  Beginning 23  Ending    Progress report will be due (10 Rx or 30 days whichever is less): 51       Recertification will be due (POC Duration  / 90 days whichever is less): 10 weeks POC        Visit # Insurance Allowable Auth Required   In-person [address] []  Yes [x]  No    Telehealth   []  Yes []  No    Total            Functional Scale: OSW 50%    Date assessed:  23      Therapy Diagnosis/Practice Pattern:F      Number of Comorbidities:  []0     []1-2    [x]3+    Latex Allergy:  [x]NO      []YES  Preferred Language for Healthcare:   [x]English       []other:      Pain level:  5-8/10    SUBJECTIVE: Patient reports had guest over the weekend. Up for while cleaning and had increase low back pain. Cramping on outer side of left leg. Able to still sleep at night. Sitting improved. Able to sit on couch for 1.5 hours before needing to get up.      OBJECTIVE: See eval  Observation:   Test measurements:      RESTRICTIONS/PRECAUTIONS: DM 2, OA multiple joints, Hx of cancer - breast, HTN  (medication), Anxiety/ depression, B TKR, Right RTC repair, Hysterectomy     Exercises/Interventions:     Therapeutic Ex (36340) Sets/sec Reps Notes/CUES   Bike  X 5 min     Supine TA HEP    HEP   Bridge with TA SB 5\"  10 x  HEP   HEP   HEP   Supine HS stretch 20\"  3 x     TA with march   10 x     SB LTR/ DKTC 5\"  10 x ea    BKFO with TA GVL 5\"  10 x     Clamshells 5\"  15 x        TB row/TB extension with TA GTB  20x ea SBA Seated SB flexion roll out  20 x     Sit to stand   x 10    Hip ABD standing/ hip extension   2 x 10                 Manual Intervention (14113)      SKTC/ Manual HS stretch   X 4 min     Prone segmental distraction/ STM to paraspinals  X 4 min        Prone quad stretch  X 3 min                 NMR re-education (23238)   CUES NEEDED   Lateral stepping along wall   2  lap No hands   Semi tandem   2 x 20\"     DLS with head turns  X 1 min                                         Therapeutic Activity (94589)                                          Access Code: [email or web address]  URL: [email or web address]  Date: 02/01/2023  Prepared by: Magana Plate     Exercises  Supine Piriformis Stretch with Foot on Ground - 1-2 x daily - 7 x weekly - 3 reps - 20 hold  Supine Transversus Abdominis Bracing - Hands on Ground - 1-2 x daily - 7 x weekly - 10 reps - 5 hold  Supine Hip Adduction Isometric with Ball - 1-2 x daily - 7 x weekly - 15 reps - 5 hold  Hooklying Abduction with Resistance - 1-2 x daily - 7 x weekly - 15 reps - 5 hold  Bridge - 1-2 x daily - 7 x weekly - 5-10 reps - 5 hold  Seated Upright Posture Correction - 1-2 x daily - 7 x weekly - 15 reps - 5 hold       Therapeutic Exercise and NMR EXR  [x] (96839) Provided verbal/tactile cueing for activities related to strengthening, flexibility, endurance, ROM  for improvements in proximal hip and core control with self care, mobility, lifting and ambulation.  [] (16798) Provided verbal/tactile cueing for activities related to improving balance, coordination, kinesthetic sense, posture, motor skill, proprioception  to assist with core control in self care, mobility, lifting, and ambulation. Therapeutic Activities:    [] (01667 or 79591) Provided verbal/tactile cueing for activities related to improving balance, coordination, kinesthetic sense, posture, motor skill, proprioception and motor activation to allow for proper function  with self care and ADLs  [] (79073) Provided training and instruction to the patient for proper core and proximal hip recruitment and positioning with ambulation re-education     Home Exercise Program:    [x] (29708) Reviewed/Progressed HEP activities related to strengthening, flexibility, endurance, ROM of core, proximal hip and LE for functional self-care, mobility, lifting and ambulation   [] (95684) Reviewed/Progressed HEP activities related to improving balance, coordination, kinesthetic sense, posture, motor skill, proprioception of core, proximal hip and LE for self care, mobility, lifting, and ambulation      Manual Treatments:  PROM / STM / Oscillations-Mobs:  G-I, II, III, IV (PA's, Inf., Post.)  [x] (79554) Provided manual therapy to mobilize proximal hip and LS spine soft tissue/joints for the purpose of modulating pain, promoting relaxation,  increasing ROM, reducing/eliminating soft tissue swelling/inflammation/restriction, improving soft tissue extensibility and allowing for proper ROM for normal function with self care, mobility, lifting and ambulation. Modalities:    Charges  Timed Code Treatment Minutes: 35   Total Treatment Minutes: 35     Medicare total (add KX at $2000)  407         [] EVAL (LOW) 95262   [] EVAL (MOD) 20087   [] EVAL (HIGH) 60279   [] RE-EVAL     [x] FS(94689) x  1  [] IONTO  [] NMR (71674) x     [] VASO  [x] Manual (43769) x   1   [] Other:  [] TA x      [] Mech Traction (71340)  [] ES(attended) (69608)      [] ES (un) (91851):     Goals:   Patient stated goal: Decrease pain. Able to tolerate sitting. Return to [employer].    [] Progressing: [] Met: [] Not Met: [] Adjusted     Therapist goals for Patient:   Short Term Goals: To be achieved in: 2 weeks  1. Independent in HEP and progression per patient tolerance, in order to prevent re-injury. [] Progressing: [] Met: [] Not Met: [] Adjusted  2. Patient will have a decrease in pain to facilitate improvement in movement, function, and ADLs as indicated by Functional Deficits. [] Progressing: [] Met: [] Not Met: [] Adjusted        Long Term Goals: To be achieved in: 10 weeks  1. Disability index score of 30% or less per Modified Oswestry to assist with reaching prior level of function. [] Progressing: [] Met: [] Not Met: [] Adjusted  2. Patient will demonstrate increased AROM to WNL, good LS mobility, good hip ROM to allow for proper joint functioning as indicated by patients Functional Deficits. [] Progressing: [] Met: [] Not Met: [] Adjusted  3. Patient will demonstrate an increase in Strength to good proximal hip and core activation to allow for proper functional mobility as indicated by patients Functional Deficits. [] Progressing: [] Met: [] Not Met: [] Adjusted  4. Patient will return to tolerating standing and sitting positions for 30 minutes without increased symptoms or restriction no greater than 2/10. [] Progressing: [] Met: [] Not Met: [] Adjusted  5. Patient will return to water aerobics for general health and wellness with minimal to no symptoms or restriction. [] Progressing: [] Met: [] Not Met: [] Adjusted            Progression Towards Functional goals:  [] Patient is progressing as expected towards functional goals listed. [] Progression is slowed due to complexities listed. [] Progression has been slowed due to co-morbidities. [x] Plan just implemented, too soon to assess goals progression  [] Other:     Overall Progression Towards Functional goals/ Treatment Progress Update:  [] Patient is progressing as expected towards functional goals listed. [] Progression is slowed due to complexities/Impairments listed.   [] Progression has been slowed due to co-morbidities. [x] Plan just implemented, too soon to assess goals progression <30days   [] Goals require adjustment due to lack of progress  [] Patient is not progressing as expected and requires additional follow up with physician  [] Other    Prognosis for POC: [x] Good [] Fair  [] Poor      Patient requires continued skilled intervention: [x] Yes  [] No    Treatment/Activity Tolerance:  [x] Patient able to complete treatment  [] Patient limited by fatigue  [] Patient limited by pain    [] Patient limited by other medical complications  [] Other:     ASSESSMENT: Improved standing balance today. Less sway in standing TB exercises. Continue to challenge balance. May benefit from BIODEX balance in future visits. PLAN: See eval  [x] Continue per plan of care [] Alter current plan (see comments above)  [] Plan of care initiated [] Hold pending MD visit [] Discharge      Electronically signed by:  Calos Josue PT    Note: If patient does not return for scheduled/ recommended follow up visits, this note will serve as a discharge from care along with most recent update on progress.

## 2023-02-16 ENCOUNTER — HOSPITAL ENCOUNTER (OUTPATIENT)
Dept: PHYSICAL THERAPY | Age: 83
Setting detail: THERAPIES SERIES
Discharge: HOME | End: 2023-02-16
Payer: MEDICARE

## 2023-02-16 PROCEDURE — 97110 THERAPEUTIC EXERCISES: CPT | Performed by: PHYSICAL THERAPIST

## 2023-02-16 PROCEDURE — 97140 MANUAL THERAPY 1/> REGIONS: CPT | Performed by: PHYSICAL THERAPIST

## 2023-02-16 NOTE — FLOWSHEET NOTE
[facility],  [address] [place]  Phone: [phone]  Fax [phone]    Physical Therapy Treatment Note/ Progress Report:           Date:  2023    Patient Name:  Kim Hernández    :    MRN: [id number]  Restrictions/Precautions:    Medical/Treatment Diagnosis Information:  Back pain [M54.9]         Treatment DX:  Lumbar spine pain M54.5; Difficulty walking R26.2                                      Insurance information:  MEDICARE  Physician Information:  BRENNA Bates *  Has the plan of care been signed (Y/N):        []  Yes  [x]  No     Date of Patient follow up with Physician:       Is this a Progress Report:     []  Yes  [x]  No        If Yes:  Date Range for reporting period:  Beginning 23  Ending    Progress report will be due (10 Rx or 30 days whichever is less):        Recertification will be due (POC Duration  / 90 days whichever is less): 10 weeks POC        Visit # Insurance Allowable Auth Required   In-person [address] []  Yes [x]  No    Telehealth   []  Yes []  No    Total            Functional Scale: OSW 50%    Date assessed:  23      Therapy Diagnosis/Practice Pattern:F      Number of Comorbidities:  []0     []1-2    [x]3+    Latex Allergy:  [x]NO      []YES  Preferred Language for Healthcare:   [x]English       []other:      Pain level:  5-8/10    SUBJECTIVE: Compliant with HEP. Feeling improved with therapy. Twinge this morning. Wonders how sleeping as usually some amount of pain in the mornings.      OBJECTIVE: See eval  Observation:   Test measurements:      RESTRICTIONS/PRECAUTIONS: DM 2, OA multiple joints, Hx of cancer - breast, HTN  (medication), Anxiety/ depression, B TKR, Right RTC repair, Hysterectomy     Exercises/Interventions:     Therapeutic Ex (12625) Sets/sec Reps Notes/CUES   Bike  X 5 min     Supine TA HEP    HEP   Bridge with TA SB 5\"  10 x  HEP   HEP HEP   Supine HS stretch 20\"  3 x     TA with march      SB LTR/ DKTC 5\"  10 x ea    BKFO with TA GVL 5\"  10 x     Clamshells 5\"  15 x        TB row/TB extension with TA GTB  20x ea SBA Seated SB flexion roll out  20 x     Sit to stand   x 10    Hip ABD standing/ hip extension   2 x 10     FSU 6 in  10 x B    TB palloff press Double RTB 10 x B          Manual Intervention (26734)         Prone segmental distraction/ STM to paraspinals  X 4 min        Prone quad stretch  X 3 min                 NMR re-education (67968)   CUES NEEDED   Lateral stepping along wall   2  lap No hands   Semi tandem   2 x 20\"     DLS with head turns  X 1 min                                         Therapeutic Activity (90193)                                          Access Code: [email or web address]  URL: [email or web address]  Date: 02/01/2023  Prepared by: Sheyla Haley     Exercises  Supine Piriformis Stretch with Foot on Ground - 1-2 x daily - 7 x weekly - 3 reps - 20 hold  Supine Transversus Abdominis Bracing - Hands on Ground - 1-2 x daily - 7 x weekly - 10 reps - 5 hold  Supine Hip Adduction Isometric with Ball - 1-2 x daily - 7 x weekly - 15 reps - 5 hold  Hooklying Abduction with Resistance - 1-2 x daily - 7 x weekly - 15 reps - 5 hold  Bridge - 1-2 x daily - 7 x weekly - 5-10 reps - 5 hold  Seated Upright Posture Correction - 1-2 x daily - 7 x weekly - 15 reps - 5 hold       Therapeutic Exercise and NMR EXR  [x] (16620) Provided verbal/tactile cueing for activities related to strengthening, flexibility, endurance, ROM  for improvements in proximal hip and core control with self care, mobility, lifting and ambulation.  [] (70510) Provided verbal/tactile cueing for activities related to improving balance, coordination, kinesthetic sense, posture, motor skill, proprioception  to assist with core control in self care, mobility, lifting, and ambulation.      Therapeutic Activities:    [] (80412 or 31721) Provided verbal/tactile cueing for activities related to improving balance, coordination, kinesthetic sense, posture, motor skill, proprioception and motor activation to allow for proper function  with self care and ADLs  [] (23461) Provided training and instruction to the patient for proper core and proximal hip recruitment and positioning with ambulation re-education     Home Exercise Program:    [x] (52605) Reviewed/Progressed HEP activities related to strengthening, flexibility, endurance, ROM of core, proximal hip and LE for functional self-care, mobility, lifting and ambulation   [] (72182) Reviewed/Progressed HEP activities related to improving balance, coordination, kinesthetic sense, posture, motor skill, proprioception of core, proximal hip and LE for self care, mobility, lifting, and ambulation      Manual Treatments:  PROM / STM / Oscillations-Mobs:  G-I, II, III, IV (PA's, Inf., Post.)  [x] (93939) Provided manual therapy to mobilize proximal hip and LS spine soft tissue/joints for the purpose of modulating pain, promoting relaxation,  increasing ROM, reducing/eliminating soft tissue swelling/inflammation/restriction, improving soft tissue extensibility and allowing for proper ROM for normal function with self care, mobility, lifting and ambulation. Modalities:    Charges  Timed Code Treatment Minutes: 38   Total Treatment Minutes: 38     Medicare total (add KX at $2000)  492         [] EVAL (LOW) 19604   [] EVAL (MOD) 58960   [] EVAL (HIGH) 97383   [] RE-EVAL     [x] WD(82770) x  2  [] IONTO  [] NMR (06108) x     [] VASO  [x] Manual (72986) x   1   [] Other:  [] TA x      [] Mech Traction (82608)  [] ES(attended) (79224)      [] ES (un) (74273):     Goals:   Patient stated goal: Decrease pain. Able to tolerate sitting. Return to [employer]. [] Progressing: [] Met: [] Not Met: [] Adjusted     Therapist goals for Patient:   Short Term Goals: To be achieved in: 2 weeks  1.  Independent in HEP and progression per patient tolerance, in order to prevent re-injury. [] Progressing: [] Met: [] Not Met: [] Adjusted  2. Patient will have a decrease in pain to facilitate improvement in movement, function, and ADLs as indicated by Functional Deficits. [] Progressing: [] Met: [] Not Met: [] Adjusted        Long Term Goals: To be achieved in: 10 weeks  1. Disability index score of 30% or less per Modified Oswestry to assist with reaching prior level of function. [] Progressing: [] Met: [] Not Met: [] Adjusted  2. Patient will demonstrate increased AROM to WNL, good LS mobility, good hip ROM to allow for proper joint functioning as indicated by patients Functional Deficits. [] Progressing: [] Met: [] Not Met: [] Adjusted  3. Patient will demonstrate an increase in Strength to good proximal hip and core activation to allow for proper functional mobility as indicated by patients Functional Deficits. [] Progressing: [] Met: [] Not Met: [] Adjusted  4. Patient will return to tolerating standing and sitting positions for 30 minutes without increased symptoms or restriction no greater than 2/10. [] Progressing: [] Met: [] Not Met: [] Adjusted  5. Patient will return to water aerobics for general health and wellness with minimal to no symptoms or restriction. [] Progressing: [] Met: [] Not Met: [] Adjusted            Progression Towards Functional goals:  [x] Patient is progressing as expected towards functional goals listed. [] Progression is slowed due to complexities listed. [] Progression has been slowed due to co-morbidities. [] Plan just implemented, too soon to assess goals progression  [] Other:     Overall Progression Towards Functional goals/ Treatment Progress Update:  [x] Patient is progressing as expected towards functional goals listed. [] Progression is slowed due to complexities/Impairments listed. [] Progression has been slowed due to co-morbidities.   Plan just implemented, too soon to assess goals progression <30days   [] Goals require adjustment due to lack of progress  [] Patient is not progressing as expected and requires additional follow up with physician  [] Other    Prognosis for POC: [x] Good [] Fair  [] Poor      Patient requires continued skilled intervention: [x] Yes  [] No    Treatment/Activity Tolerance:  [x] Patient able to complete treatment  [] Patient limited by fatigue  [] Patient limited by pain    [] Patient limited by other medical complications  [] Other:     ASSESSMENT: Improved standing balance today. Fatigued with standing exercises today. Accepting all new exercises well. May benefit from BIODEX balance in future visits. PLAN: See eval  [x] Continue per plan of care [] Alter current plan (see comments above)  [] Plan of care initiated [] Hold pending MD visit [] Discharge      Electronically signed by:  Pamela Haywood PT    Note: If patient does not return for scheduled/ recommended follow up visits, this note will serve as a discharge from care along with most recent update on progress.

## 2023-02-20 ENCOUNTER — HOSPITAL ENCOUNTER (OUTPATIENT)
Dept: PHYSICAL THERAPY | Age: 83
Setting detail: THERAPIES SERIES
Discharge: HOME | End: 2023-02-20
Payer: MEDICARE

## 2023-02-20 PROCEDURE — 97140 MANUAL THERAPY 1/> REGIONS: CPT | Performed by: PHYSICAL THERAPIST

## 2023-02-20 PROCEDURE — 97110 THERAPEUTIC EXERCISES: CPT | Performed by: PHYSICAL THERAPIST

## 2023-02-20 NOTE — FLOWSHEET NOTE
[facility],  [address] [place]  Phone: [phone]  Fax [phone]    Physical Therapy Treatment Note/ Progress Report:           Date:  2023    Patient Name:  Dexter Tena    :    MRN: [id number]  Restrictions/Precautions:    Medical/Treatment Diagnosis Information:  Back pain [M54.9]         Treatment DX:  Lumbar spine pain M54.5; Difficulty walking R26.2                                      Insurance information:  MEDICARE  Physician Information:  BRENNA Michele *  Has the plan of care been signed (Y/N):        []  Yes  [x]  No     Date of Patient follow up with Physician:       Is this a Progress Report:     []  Yes  [x]  No        If Yes:  Date Range for reporting period:  Beginning 23  Ending    Progress report will be due (10 Rx or 30 days whichever is less): 29       Recertification will be due (POC Duration  / 90 days whichever is less): 10 weeks POC        Visit # Insurance Allowable Auth Required   In-person [address] []  Yes [x]  No    Telehealth   []  Yes []  No    Total            Functional Scale: OSW 50%    Date assessed:  23      Therapy Diagnosis/Practice Pattern:F      Number of Comorbidities:  []0     []1-2    [x]3+    Latex Allergy:  [x]NO      []YES  Preferred Language for Healthcare:   [x]English       []other:      Pain level:  5-8/10    SUBJECTIVE: Wrist/ thumb is really sore today and swollen. 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 Back is feeling pretty good. Did fair amount of walking going to [employer] game. Back help up well.       OBJECTIVE: See eval  Observation:   Test measurements:      RESTRICTIONS/PRECAUTIONS: DM 2, OA multiple joints, Hx of cancer - breast, HTN  (medication), Anxiety/ depression, B TKR, Right RTC repair, Hysterectomy     Exercises/Interventions:     Therapeutic Ex (60795) Sets/sec Reps Notes/CUES   Bike  X 5 min Supine TA HEP    HEP   Bridge with TA SB 5\"  10 x  HEP   HEP   HEP   Supine HS stretch 20\"  3 x     TA with march      SB LTR/ DKTC 5\"  10 x ea    BKFO with TA GVL 5\"  10 x     Clamshells OVL 5\"  15 x        SBA Seated SB flexion roll out  20 x     Sit to stand   x 10    Hip ABD standing/ hip extension   2 x 10     FSU 6 in  10 x B             Manual Intervention (67166)         Prone segmental distraction/ STM to paraspinals  X 4 min        Prone quad stretch  X 3 min                 NMR re-education (24832)   CUES NEEDED   Lateral stepping along wall   2  lap No hands   Semi tandem   2 x 20\"     DLS with head turns/ EC  X 1 min  ea                                        Therapeutic Activity (60059)                                          Access Code: [email or web address]  URL: [email or web address]  Date: 02/01/2023  Prepared by: J Luis Pintos     Exercises  Supine Piriformis Stretch with Foot on Ground - 1-2 x daily - 7 x weekly - 3 reps - 20 hold  Supine Transversus Abdominis Bracing - Hands on Ground - 1-2 x daily - 7 x weekly - 10 reps - 5 hold  Supine Hip Adduction Isometric with Ball - 1-2 x daily - 7 x weekly - 15 reps - 5 hold  Hooklying Abduction with Resistance - 1-2 x daily - 7 x weekly - 15 reps - 5 hold  Bridge - 1-2 x daily - 7 x weekly - 5-10 reps - 5 hold  Seated Upright Posture Correction - 1-2 x daily - 7 x weekly - 15 reps - 5 hold       Therapeutic Exercise and NMR EXR  [x] (83063) Provided verbal/tactile cueing for activities related to strengthening, flexibility, endurance, ROM  for improvements in proximal hip and core control with self care, mobility, lifting and ambulation.  [] (41334) Provided verbal/tactile cueing for activities related to improving balance, coordination, kinesthetic sense, posture, motor skill, proprioception  to assist with core control in self care, mobility, lifting, and ambulation.      Therapeutic Activities:    [] (74161 or 82219) Provided verbal/tactile cueing for activities related to improving balance, coordination, kinesthetic sense, posture, motor skill, proprioception and motor activation to allow for proper function  with self care and ADLs  [] (77209) Provided training and instruction to the patient for proper core and proximal hip recruitment and positioning with ambulation re-education     Home Exercise Program:    [x] (00459) Reviewed/Progressed HEP activities related to strengthening, flexibility, endurance, ROM of core, proximal hip and LE for functional self-care, mobility, lifting and ambulation   [] (47069) Reviewed/Progressed HEP activities related to improving balance, coordination, kinesthetic sense, posture, motor skill, proprioception of core, proximal hip and LE for self care, mobility, lifting, and ambulation      Manual Treatments:  PROM / STM / Oscillations-Mobs:  G-I, II, III, IV (PA's, Inf., Post.)  [x] (34184) Provided manual therapy to mobilize proximal hip and LS spine soft tissue/joints for the purpose of modulating pain, promoting relaxation,  increasing ROM, reducing/eliminating soft tissue swelling/inflammation/restriction, improving soft tissue extensibility and allowing for proper ROM for normal function with self care, mobility, lifting and ambulation. Modalities:    Charges  Timed Code Treatment Minutes: 45   Total Treatment Minutes: 45     Medicare total (add KX at $2000)  577         [] EVAL (LOW) 08105   [] EVAL (MOD) 95784   [] EVAL (HIGH) 75148   [] RE-EVAL     [x] SA(82753) x  2  [] IONTO  [] NMR (48391) x     [] VASO  [x] Manual (26115) x   1   [] Other:  [] TA x      [] Mech Traction (93090)  [] ES(attended) (91775)      [] ES (un) (30354):     Goals:   Patient stated goal: Decrease pain. Able to tolerate sitting. Return to [employer]. [] Progressing: [] Met: [] Not Met: [] Adjusted     Therapist goals for Patient:   Short Term Goals: To be achieved in: 2 weeks  1.  Independent in HEP and progression per patient tolerance, in order to prevent re-injury. [] Progressing: [] Met: [] Not Met: [] Adjusted  2. Patient will have a decrease in pain to facilitate improvement in movement, function, and ADLs as indicated by Functional Deficits. [] Progressing: [] Met: [] Not Met: [] Adjusted        Long Term Goals: To be achieved in: 10 weeks  1. Disability index score of 30% or less per Modified Oswestry to assist with reaching prior level of function. [] Progressing: [] Met: [] Not Met: [] Adjusted  2. Patient will demonstrate increased AROM to WNL, good LS mobility, good hip ROM to allow for proper joint functioning as indicated by patients Functional Deficits. [] Progressing: [] Met: [] Not Met: [] Adjusted  3. Patient will demonstrate an increase in Strength to good proximal hip and core activation to allow for proper functional mobility as indicated by patients Functional Deficits. [] Progressing: [] Met: [] Not Met: [] Adjusted  4. Patient will return to tolerating standing and sitting positions for 30 minutes without increased symptoms or restriction no greater than 2/10. [] Progressing: [] Met: [] Not Met: [] Adjusted  5. Patient will return to water aerobics for general health and wellness with minimal to no symptoms or restriction. [] Progressing: [] Met: [] Not Met: [] Adjusted            Progression Towards Functional goals:  [x] Patient is progressing as expected towards functional goals listed. [] Progression is slowed due to complexities listed. [] Progression has been slowed due to co-morbidities. [] Plan just implemented, too soon to assess goals progression  [] Other:     Overall Progression Towards Functional goals/ Treatment Progress Update:  [x] Patient is progressing as expected towards functional goals listed. [] Progression is slowed due to complexities/Impairments listed. [] Progression has been slowed due to co-morbidities.   Plan just implemented, too soon to assess goals progression <30days   [] Goals require adjustment due to lack of progress  [] Patient is not progressing as expected and requires additional follow up with physician  [] Other    Prognosis for POC: [x] Good [] Fair  [] Poor      Patient requires continued skilled intervention: [x] Yes  [] No    Treatment/Activity Tolerance:  [x] Patient able to complete treatment  [] Patient limited by fatigue  [] Patient limited by pain    [] Patient limited by other medical complications  [] Other:     ASSESSMENT: Held theraband exercises today secondary to hand/ thumb discomfort today. Demonstrating improved strength and balance in standing. May benefit from BIODEX balance in future visits. PLAN: See eval  [x] Continue per plan of care [] Alter current plan (see comments above)  [] Plan of care initiated [] Hold pending MD visit [] Discharge      Electronically signed by:  Syed Ibrahim PT    Note: If patient does not return for scheduled/ recommended follow up visits, this note will serve as a discharge from care along with most recent update on progress.

## 2023-02-23 ENCOUNTER — HOSPITAL ENCOUNTER (OUTPATIENT)
Dept: PHYSICAL THERAPY | Age: 83
Setting detail: THERAPIES SERIES
Discharge: HOME | End: 2023-02-23
Payer: MEDICARE

## 2023-02-23 PROCEDURE — 97140 MANUAL THERAPY 1/> REGIONS: CPT | Performed by: PHYSICAL THERAPIST

## 2023-02-23 PROCEDURE — 97110 THERAPEUTIC EXERCISES: CPT | Performed by: PHYSICAL THERAPIST

## 2023-02-23 NOTE — FLOWSHEET NOTE
[facility],  [address] [place]  Phone: [phone]  Fax [phone]    Physical Therapy Treatment Note/ Progress Report:           Date:  2023    Patient Name:  Valdez Mooney    :    MRN: [id number]  Restrictions/Precautions:    Medical/Treatment Diagnosis Information:  Back pain [M54.9]         Treatment DX:  Lumbar spine pain M54.5; Difficulty walking R26.2                                      Insurance information:  MEDICARE  Physician Information:  BRENNA Domínguez *  Has the plan of care been signed (Y/N):        []  Yes  [x]  No     Date of Patient follow up with Physician:       Is this a Progress Report:     []  Yes  [x]  No        If Yes:  Date Range for reporting period:  Beginning 23  Ending    Progress report will be due (10 Rx or 30 days whichever is less): 22       Recertification will be due (POC Duration  / 90 days whichever is less): 10 weeks POC        Visit # Insurance Allowable Auth Required   In-person 8 [facility] []  Yes [x]  No    Telehealth   []  Yes []  No    Total            Functional Scale: OSW 50%    Date assessed:  23      Therapy Diagnosis/Practice Pattern:F      Number of Comorbidities:  []0     []1-2    [x]3+    Latex Allergy:  [x]NO      []YES  Preferred Language for Healthcare:   [x]English       []other:      Pain level:  5-8/10    SUBJECTIVE: Wrist/ thumb still remains sore. Dominant hand. Patient reports using cane on opposite hand. Back is feeling really good. Hoping to get back into aquatic therapy.       OBJECTIVE: See eval  Observation:   Test measurements:      RESTRICTIONS/PRECAUTIONS: DM 2, OA multiple joints, Hx of cancer - breast, HTN  (medication), Anxiety/ depression, B TKR, Right RTC repair, Hysterectomy     Exercises/Interventions:     Therapeutic Ex (80594) Sets/sec Reps Notes/CUES   Bike  X 5 min     Incline stretch 30\"  3 x    Supine TA HEP    HEP   Bridge with TA SB 5\"  10 x  HEP   HEP   HEP   Supine HS stretch 20\"  3 x     TA with march      SB LTR/ DKTC 5\"  10 x ea    BKFO with TA GVL 5\"  10 x     Clamshells OVL 5\"  15 x        SBA Seated SB flexion roll out  20 x     Sit to stand   x 12    Hip ABD standing/ hip extension   2 x 10     FSU 6 in  10 x B             Manual Intervention (62253)         Prone segmental distraction/ STM to paraspinals  X 4 min        Prone quad stretch  X 3 min                 NMR re-education (58468)   CUES NEEDED   Lateral stepping along wall   3  lap No hands   Semi tandem   2 x 20\"     DLS with head turns/ EC  X 1 min  ea                                        Therapeutic Activity (74614)                                          Access Code: [email or web address]  URL: [email or web address]  Date: 02/01/2023  Prepared by: Julio Moulton     Exercises  Supine Piriformis Stretch with Foot on Ground - 1-2 x daily - 7 x weekly - 3 reps - 20 hold  Supine Transversus Abdominis Bracing - Hands on Ground - 1-2 x daily - 7 x weekly - 10 reps - 5 hold  Supine Hip Adduction Isometric with Ball - 1-2 x daily - 7 x weekly - 15 reps - 5 hold  Hooklying Abduction with Resistance - 1-2 x daily - 7 x weekly - 15 reps - 5 hold  Bridge - 1-2 x daily - 7 x weekly - 5-10 reps - 5 hold  Seated Upright Posture Correction - 1-2 x daily - 7 x weekly - 15 reps - 5 hold       Therapeutic Exercise and NMR EXR  [x] (81393) Provided verbal/tactile cueing for activities related to strengthening, flexibility, endurance, ROM  for improvements in proximal hip and core control with self care, mobility, lifting and ambulation.  [] (62477) Provided verbal/tactile cueing for activities related to improving balance, coordination, kinesthetic sense, posture, motor skill, proprioception  to assist with core control in self care, mobility, lifting, and ambulation.      Therapeutic Activities:    [] (56603 or ) Provided verbal/tactile cueing for activities related to improving balance, coordination, kinesthetic sense, posture, motor skill, proprioception and motor activation to allow for proper function  with self care and ADLs  [] (71929) Provided training and instruction to the patient for proper core and proximal hip recruitment and positioning with ambulation re-education     Home Exercise Program:    [x] (10824) Reviewed/Progressed HEP activities related to strengthening, flexibility, endurance, ROM of core, proximal hip and LE for functional self-care, mobility, lifting and ambulation   [] (60103) Reviewed/Progressed HEP activities related to improving balance, coordination, kinesthetic sense, posture, motor skill, proprioception of core, proximal hip and LE for self care, mobility, lifting, and ambulation      Manual Treatments:  PROM / STM / Oscillations-Mobs:  G-I, II, III, IV (PA's, Inf., Post.)  [x] (41461) Provided manual therapy to mobilize proximal hip and LS spine soft tissue/joints for the purpose of modulating pain, promoting relaxation,  increasing ROM, reducing/eliminating soft tissue swelling/inflammation/restriction, improving soft tissue extensibility and allowing for proper ROM for normal function with self care, mobility, lifting and ambulation. Modalities:    Charges  Timed Code Treatment Minutes: 38   Total Treatment Minutes: 38     Medicare total (add KX at $2000)  662         [] EVAL (LOW) 26945   [] EVAL (MOD) 20017   [] EVAL (HIGH) 49399   [] RE-EVAL     [x] WZ(00924) x  2  [] IONTO  [] NMR (84719) x     [] VASO  [x] Manual (66689) x   1   [] Other:  [] TA x      [] Mech Traction (33417)  [] ES(attended) (51338)      [] ES (un) (21158):     Goals:   Patient stated goal: Decrease pain. Able to tolerate sitting. Return to [employer]. [] Progressing: [] Met: [] Not Met: [] Adjusted     Therapist goals for Patient:   Short Term Goals: To be achieved in: 2 weeks  1.  Independent in HEP and progression per patient tolerance, in order to prevent re-injury. [] Progressing: [] Met: [] Not Met: [] Adjusted  2. Patient will have a decrease in pain to facilitate improvement in movement, function, and ADLs as indicated by Functional Deficits. [] Progressing: [] Met: [] Not Met: [] Adjusted        Long Term Goals: To be achieved in: 10 weeks  1. Disability index score of 30% or less per Modified Oswestry to assist with reaching prior level of function. [] Progressing: [] Met: [] Not Met: [] Adjusted  2. Patient will demonstrate increased AROM to WNL, good LS mobility, good hip ROM to allow for proper joint functioning as indicated by patients Functional Deficits. [] Progressing: [] Met: [] Not Met: [] Adjusted  3. Patient will demonstrate an increase in Strength to good proximal hip and core activation to allow for proper functional mobility as indicated by patients Functional Deficits. [] Progressing: [] Met: [] Not Met: [] Adjusted  4. Patient will return to tolerating standing and sitting positions for 30 minutes without increased symptoms or restriction no greater than 2/10. [] Progressing: [] Met: [] Not Met: [] Adjusted  5. Patient will return to water aerobics for general health and wellness with minimal to no symptoms or restriction. [] Progressing: [] Met: [] Not Met: [] Adjusted            Progression Towards Functional goals:  [x] Patient is progressing as expected towards functional goals listed. [] Progression is slowed due to complexities listed. [] Progression has been slowed due to co-morbidities. [] Plan just implemented, too soon to assess goals progression  [] Other:     Overall Progression Towards Functional goals/ Treatment Progress Update:  [x] Patient is progressing as expected towards functional goals listed. [] Progression is slowed due to complexities/Impairments listed. [] Progression has been slowed due to co-morbidities.   Plan just implemented, too soon to assess goals progression <30days   [] Goals require adjustment due to lack of progress  [] Patient is not progressing as expected and requires additional follow up with physician  [] Other    Prognosis for POC: [x] Good [] Fair  [] Poor      Patient requires continued skilled intervention: [x] Yes  [] No    Treatment/Activity Tolerance:  [x] Patient able to complete treatment  [] Patient limited by fatigue  [] Patient limited by pain    [] Patient limited by other medical complications  [] Other:     ASSESSMENT: Held theraband exercises today secondary to hand/ thumb discomfort today. Demonstrating improved strength and balance in standing. May be progressing towards HEP and return to aquatic therapy. PLAN: See eval  [x] Continue per plan of care [] Alter current plan (see comments above)  [] Plan of care initiated [] Hold pending MD visit [] Discharge      Electronically signed by:  Vicky Chicas PT    Note: If patient does not return for scheduled/ recommended follow up visits, this note will serve as a discharge from care along with most recent update on progress.

## 2023-03-01 ENCOUNTER — HOSPITAL ENCOUNTER (OUTPATIENT)
Dept: PHYSICAL THERAPY | Age: 83
Setting detail: THERAPIES SERIES
Discharge: HOME | End: 2023-03-01
Payer: MEDICARE

## 2023-03-01 PROCEDURE — 97140 MANUAL THERAPY 1/> REGIONS: CPT | Performed by: PHYSICAL THERAPIST

## 2023-03-01 PROCEDURE — 97110 THERAPEUTIC EXERCISES: CPT | Performed by: PHYSICAL THERAPIST

## 2023-03-01 NOTE — FLOWSHEET NOTE
[facility], [address] [place]  Phone: [phone]  Fax [phone]    Physical Therapy Treatment Note/ Progress Report:           Date:  3/1/2023    Patient Name:  Tyson Chung    :  [birth year]  MRN: [id number]  Restrictions/Precautions:    Medical/Treatment Diagnosis Information:  Back pain [M54.9]         Treatment DX:  Lumbar spine pain M54.5; Difficulty walking R26.2                                      Insurance information:  MEDICARE  Physician Information:  BRENNA Mann *  Has the plan of care been signed (Y/N):        []  Yes  [x]  No     Date of Patient follow up with Physician:       Is this a Progress Report:     []  Yes  [x]  No        If Yes:  Date Range for reporting period:  Beginning 23  Ending    Progress report will be due (10 Rx or 30 days whichever is less): 3/8/51       Recertification will be due (POC Duration  / 90 days whichever is less): 10 weeks POC        Visit # Insurance Allowable Auth Required   In-person [address] []  Yes [x]  No    Telehealth   []  Yes []  No    Total            Functional Scale: OSW 50%    Date assessed:  23      Therapy Diagnosis/Practice Pattern:F      Number of Comorbidities:  []0     []1-2    [x]3+    Latex Allergy:  [x]NO      []YES  Preferred Language for Healthcare:   [x]English       []other:      Pain level: 0/10    SUBJECTIVE: Back is feeling pretty good. Sleeping well. Hoping to get back to aquatic therapy.       OBJECTIVE: See eval  Observation:   Test measurements:  OSW 14%    RESTRICTIONS/PRECAUTIONS: DM 2, OA multiple joints, Hx of cancer - breast, HTN  (medication), Anxiety/ depression, B TKR, Right RTC repair, Hysterectomy     Exercises/Interventions:     Therapeutic Ex (96496) Sets/sec Reps Notes/CUES   Bike  X 5 min     Incline stretch 30\"  3 x    Supine TA HEP    HEP   Bridge with TA SB 5\"  10 x  HEP   HEP   HEP   Supine HS stretch 20\" 3 x     TA with march      SB LTR/ DKTC 5\"  10 x ea    BKFO with TA GVL 5\"  10 x     Clamshells OVL 5\"  15 x        SBA Seated SB flexion roll out  20 x     Sit to stand   x 12    Hip ABD standing/ hip extension   2 x 10     FSU 6 in  10 x B             Manual Intervention (95747)         Prone segmental distraction/ STM to paraspinals  X 4 min        Prone quad stretch  X 3 min                 NMR re-education (41971)   CUES NEEDED   Lateral stepping along wall   3  lap No hands   Semi tandem   2 x 20\"     DLS with head turns/ EC  X 1 min  ea                                        Therapeutic Activity (28505)                                          Access Code: [email or web address]  URL: [email or web address]  Date: 02/01/2023  Prepared by: Teresa Bodily     Exercises  Supine Piriformis Stretch with Foot on Ground - 1-2 x daily - 7 x weekly - 3 reps - 20 hold  Supine Transversus Abdominis Bracing - Hands on Ground - 1-2 x daily - 7 x weekly - 10 reps - 5 hold  Supine Hip Adduction Isometric with Ball - 1-2 x daily - 7 x weekly - 15 reps - 5 hold  Hooklying Abduction with Resistance - 1-2 x daily - 7 x weekly - 15 reps - 5 hold  Bridge - 1-2 x daily - 7 x weekly - 5-10 reps - 5 hold  Seated Upright Posture Correction - 1-2 x daily - 7 x weekly - 15 reps - 5 hold       Therapeutic Exercise and NMR EXR  [x] (71057) Provided verbal/tactile cueing for activities related to strengthening, flexibility, endurance, ROM  for improvements in proximal hip and core control with self care, mobility, lifting and ambulation.  [] (70476) Provided verbal/tactile cueing for activities related to improving balance, coordination, kinesthetic sense, posture, motor skill, proprioception  to assist with core control in self care, mobility, lifting, and ambulation.      Therapeutic Activities:    [] (04144 or 55750) Provided verbal/tactile cueing for activities related to improving balance, coordination, kinesthetic sense, posture, motor skill, proprioception and motor activation to allow for proper function  with self care and ADLs  [] (13174) Provided training and instruction to the patient for proper core and proximal hip recruitment and positioning with ambulation re-education     Home Exercise Program:    [x] (98010) Reviewed/Progressed HEP activities related to strengthening, flexibility, endurance, ROM of core, proximal hip and LE for functional self-care, mobility, lifting and ambulation   [] (97570) Reviewed/Progressed HEP activities related to improving balance, coordination, kinesthetic sense, posture, motor skill, proprioception of core, proximal hip and LE for self care, mobility, lifting, and ambulation      Manual Treatments:  PROM / STM / Oscillations-Mobs:  G-I, II, III, IV (PA's, Inf., Post.)  [x] (94593) Provided manual therapy to mobilize proximal hip and LS spine soft tissue/joints for the purpose of modulating pain, promoting relaxation,  increasing ROM, reducing/eliminating soft tissue swelling/inflammation/restriction, improving soft tissue extensibility and allowing for proper ROM for normal function with self care, mobility, lifting and ambulation. Modalities:    Charges  Timed Code Treatment Minutes: 38   Total Treatment Minutes: 38     Medicare total (add KX at $2000)  662         [] EVAL (LOW) 05576   [] EVAL (MOD) 00158   [] EVAL (HIGH) 40525   [] RE-EVAL     [x] SC(33959) x  2  [] IONTO  [] NMR (48075) x     [] VASO  [x] Manual (88592) x   1   [] Other:  [] TA x      [] Mech Traction (27938)  [] ES(attended) (34902)      [] ES (un) (95826):     Goals:   Patient stated goal: Decrease pain. Able to tolerate sitting. Return to [employer]. [] Progressing: [] Met: [] Not Met: [] Adjusted     Therapist goals for Patient:   Short Term Goals: To be achieved in: 2 weeks  1. Independent in HEP and progression per patient tolerance, in order to prevent re-injury. [] Progressing: [x] Met: [] Not Met: [] Adjusted  2.  Patient will have a decrease in pain to facilitate improvement in movement, function, and ADLs as indicated by Functional Deficits. [] Progressing: [x] Met: [] Not Met: [] Adjusted        Long Term Goals: To be achieved in: 10 weeks  1. Disability index score of 30% or less per Modified Oswestry to assist with reaching prior level of function. [] Progressing: [x] Met: [] Not Met: [] Adjusted  2. Patient will demonstrate increased AROM to WNL, good LS mobility, good hip ROM to allow for proper joint functioning as indicated by patients Functional Deficits. [] Progressing: [x] Met: [] Not Met: [] Adjusted  3. Patient will demonstrate an increase in Strength to good proximal hip and core activation to allow for proper functional mobility as indicated by patients Functional Deficits. [] Progressing: [x] Met: [] Not Met: [] Adjusted  4. Patient will return to tolerating standing and sitting positions for 30 minutes without increased symptoms or restriction no greater than 2/10. [] Progressing: [x] Met: [] Not Met: [] Adjusted  5. Patient will return to water aerobics for general health and wellness with minimal to no symptoms or restriction. [x] Progressing: [] Met: [] Not Met: [] Adjusted            Progression Towards Functional goals:  [x] Patient is progressing as expected towards functional goals listed. [] Progression is slowed due to complexities listed. [] Progression has been slowed due to co-morbidities. [] Plan just implemented, too soon to assess goals progression  [] Other:     Overall Progression Towards Functional goals/ Treatment Progress Update:  [x] Patient is progressing as expected towards functional goals listed. [] Progression is slowed due to complexities/Impairments listed. [] Progression has been slowed due to co-morbidities.   Plan just implemented, too soon to assess goals progression <30days   [] Goals require adjustment due to lack of progress  [] Patient is not progressing as expected and requires additional follow up with physician  [] Other    Prognosis for POC: [x] Good [] Fair  [] Poor      Patient requires continued skilled intervention: [x] Yes  [] No    Treatment/Activity Tolerance:  [x] Patient able to complete treatment  [] Patient limited by fatigue  [] Patient limited by pain    [] Patient limited by other medical complications  [] Other:     ASSESSMENT: Demonstrating improved strength and balance in standing. Discharge to [facility] and return to aquatic therapy. PLAN:   [] Continue per plan of care [] Alter current plan (see comments above)  [] Plan of care initiated [] Hold pending MD visit [x] Discharge      Electronically signed by:  Sherif Landa PT    Note: If patient does not return for scheduled/ recommended follow up visits, this note will serve as a discharge from care along with most recent update on progress.

## 2023-03-03 ENCOUNTER — APPOINTMENT (OUTPATIENT)
Dept: PHYSICAL THERAPY | Age: 83
End: 2023-03-03
Payer: MEDICARE

## 2024-03-21 ENCOUNTER — HOSPITAL ENCOUNTER (OUTPATIENT)
Dept: CT IMAGING | Age: 84
Discharge: HOME | End: 2024-03-21
Attending: ORTHOPAEDIC SURGERY
Payer: MEDICARE

## 2024-03-21 DIAGNOSIS — M19.011 ARTHRITIS OF RIGHT SHOULDER REGION: ICD-10-CM

## 2024-03-21 PROCEDURE — 73200 CT UPPER EXTREMITY W/O DYE: CPT
